# Patient Record
Sex: MALE | Race: WHITE | Employment: UNEMPLOYED | URBAN - METROPOLITAN AREA
[De-identification: names, ages, dates, MRNs, and addresses within clinical notes are randomized per-mention and may not be internally consistent; named-entity substitution may affect disease eponyms.]

---

## 2017-05-16 ENCOUNTER — APPOINTMENT (OUTPATIENT)
Dept: GENERAL RADIOLOGY | Age: 77
DRG: 871 | End: 2017-05-16
Attending: EMERGENCY MEDICINE
Payer: COMMERCIAL

## 2017-05-16 ENCOUNTER — HOSPITAL ENCOUNTER (INPATIENT)
Age: 77
LOS: 2 days | Discharge: HOME | DRG: 871 | End: 2017-05-18
Attending: EMERGENCY MEDICINE | Admitting: HOSPITALIST
Payer: COMMERCIAL

## 2017-05-16 ENCOUNTER — APPOINTMENT (OUTPATIENT)
Dept: CT IMAGING | Age: 77
DRG: 871 | End: 2017-05-16
Attending: PHYSICIAN ASSISTANT
Payer: COMMERCIAL

## 2017-05-16 DIAGNOSIS — J18.9 COMMUNITY ACQUIRED PNEUMONIA: Primary | ICD-10-CM

## 2017-05-16 DIAGNOSIS — J44.1 COPD EXACERBATION (HCC): ICD-10-CM

## 2017-05-16 PROBLEM — A41.9 SEPSIS (HCC): Status: ACTIVE | Noted: 2017-05-16

## 2017-05-16 LAB
ALBUMIN SERPL BCP-MCNC: 4 G/DL (ref 3.4–5)
ALBUMIN/GLOB SERPL: 0.9 {RATIO} (ref 0.8–1.7)
ALP SERPL-CCNC: 71 U/L (ref 45–117)
ALT SERPL-CCNC: 41 U/L (ref 16–61)
ANION GAP BLD CALC-SCNC: 12 MMOL/L (ref 3–18)
AST SERPL W P-5'-P-CCNC: 19 U/L (ref 15–37)
BASOPHILS # BLD AUTO: 0 K/UL (ref 0–0.1)
BASOPHILS # BLD: 0 % (ref 0–3)
BILIRUB SERPL-MCNC: 0.9 MG/DL (ref 0.2–1)
BNP SERPL-MCNC: 1424 PG/ML (ref 0–1800)
BUN SERPL-MCNC: 25 MG/DL (ref 7–18)
BUN/CREAT SERPL: 18 (ref 12–20)
CALCIUM SERPL-MCNC: 9.5 MG/DL (ref 8.5–10.1)
CHLORIDE SERPL-SCNC: 99 MMOL/L (ref 100–108)
CK MB CFR SERPL CALC: 2.3 % (ref 0–4)
CK MB SERPL-MCNC: 2.9 NG/ML (ref 5–25)
CK SERPL-CCNC: 128 U/L (ref 39–308)
CO2 SERPL-SCNC: 27 MMOL/L (ref 21–32)
CREAT SERPL-MCNC: 1.39 MG/DL (ref 0.6–1.3)
DIFFERENTIAL METHOD BLD: ABNORMAL
EOSINOPHIL # BLD: 0 K/UL (ref 0–0.4)
EOSINOPHIL NFR BLD: 0 % (ref 0–5)
ERYTHROCYTE [DISTWIDTH] IN BLOOD BY AUTOMATED COUNT: 15.6 % (ref 11.6–14.5)
GLOBULIN SER CALC-MCNC: 4.3 G/DL (ref 2–4)
GLUCOSE BLD STRIP.AUTO-MCNC: 196 MG/DL (ref 70–110)
GLUCOSE BLD STRIP.AUTO-MCNC: 216 MG/DL (ref 70–110)
GLUCOSE SERPL-MCNC: 197 MG/DL (ref 74–99)
HCT VFR BLD AUTO: 46.3 % (ref 36–48)
HGB BLD-MCNC: 15.4 G/DL (ref 13–16)
LACTATE SERPL-SCNC: 3.7 MMOL/L (ref 0.4–2)
LACTATE SERPL-SCNC: 4.3 MMOL/L (ref 0.4–2)
LACTATE SERPL-SCNC: 4.8 MMOL/L (ref 0.4–2)
LYMPHOCYTES # BLD AUTO: 5 % (ref 20–51)
LYMPHOCYTES # BLD: 1.2 K/UL (ref 0.8–3.5)
MCH RBC QN AUTO: 30.3 PG (ref 24–34)
MCHC RBC AUTO-ENTMCNC: 33.3 G/DL (ref 31–37)
MCV RBC AUTO: 91 FL (ref 74–97)
MONOCYTES # BLD: 1.2 K/UL (ref 0–1)
MONOCYTES NFR BLD AUTO: 5 % (ref 2–9)
NEUTS BAND NFR BLD MANUAL: 8 % (ref 0–5)
NEUTS SEG # BLD: 18.9 K/UL (ref 1.8–8)
NEUTS SEG NFR BLD AUTO: 82 % (ref 42–75)
PLATELET # BLD AUTO: 238 K/UL (ref 135–420)
PMV BLD AUTO: 10.4 FL (ref 9.2–11.8)
POTASSIUM SERPL-SCNC: 4.8 MMOL/L (ref 3.5–5.5)
PROT SERPL-MCNC: 8.3 G/DL (ref 6.4–8.2)
RBC # BLD AUTO: 5.09 M/UL (ref 4.7–5.5)
RBC MORPH BLD: ABNORMAL
SODIUM SERPL-SCNC: 138 MMOL/L (ref 136–145)
TROPONIN I SERPL-MCNC: <0.02 NG/ML (ref 0–0.06)
WBC # BLD AUTO: 23.1 K/UL (ref 4.6–13.2)

## 2017-05-16 PROCEDURE — 82550 ASSAY OF CK (CPK): CPT | Performed by: EMERGENCY MEDICINE

## 2017-05-16 PROCEDURE — 99285 EMERGENCY DEPT VISIT HI MDM: CPT

## 2017-05-16 PROCEDURE — 96365 THER/PROPH/DIAG IV INF INIT: CPT

## 2017-05-16 PROCEDURE — 87040 BLOOD CULTURE FOR BACTERIA: CPT | Performed by: PHYSICIAN ASSISTANT

## 2017-05-16 PROCEDURE — 74011000250 HC RX REV CODE- 250

## 2017-05-16 PROCEDURE — 83605 ASSAY OF LACTIC ACID: CPT | Performed by: HOSPITALIST

## 2017-05-16 PROCEDURE — 83880 ASSAY OF NATRIURETIC PEPTIDE: CPT | Performed by: EMERGENCY MEDICINE

## 2017-05-16 PROCEDURE — 94640 AIRWAY INHALATION TREATMENT: CPT

## 2017-05-16 PROCEDURE — 85025 COMPLETE CBC W/AUTO DIFF WBC: CPT | Performed by: EMERGENCY MEDICINE

## 2017-05-16 PROCEDURE — 36415 COLL VENOUS BLD VENIPUNCTURE: CPT | Performed by: HOSPITALIST

## 2017-05-16 PROCEDURE — 74011000258 HC RX REV CODE- 258: Performed by: PHYSICIAN ASSISTANT

## 2017-05-16 PROCEDURE — 96375 TX/PRO/DX INJ NEW DRUG ADDON: CPT

## 2017-05-16 PROCEDURE — 80053 COMPREHEN METABOLIC PANEL: CPT | Performed by: EMERGENCY MEDICINE

## 2017-05-16 PROCEDURE — 82962 GLUCOSE BLOOD TEST: CPT

## 2017-05-16 PROCEDURE — 71010 XR CHEST PORT: CPT

## 2017-05-16 PROCEDURE — 74011000250 HC RX REV CODE- 250: Performed by: HOSPITALIST

## 2017-05-16 PROCEDURE — 74011000250 HC RX REV CODE- 250: Performed by: PHYSICIAN ASSISTANT

## 2017-05-16 PROCEDURE — 74011250636 HC RX REV CODE- 250/636: Performed by: HOSPITALIST

## 2017-05-16 PROCEDURE — 65660000000 HC RM CCU STEPDOWN

## 2017-05-16 PROCEDURE — 87070 CULTURE OTHR SPECIMN AEROBIC: CPT | Performed by: HOSPITALIST

## 2017-05-16 PROCEDURE — 74011250636 HC RX REV CODE- 250/636: Performed by: FAMILY MEDICINE

## 2017-05-16 PROCEDURE — 74011250636 HC RX REV CODE- 250/636: Performed by: PHYSICIAN ASSISTANT

## 2017-05-16 PROCEDURE — 87077 CULTURE AEROBIC IDENTIFY: CPT | Performed by: HOSPITALIST

## 2017-05-16 PROCEDURE — 96361 HYDRATE IV INFUSION ADD-ON: CPT

## 2017-05-16 PROCEDURE — 77030013140 HC MSK NEB VYRM -A

## 2017-05-16 PROCEDURE — 83605 ASSAY OF LACTIC ACID: CPT | Performed by: PHYSICIAN ASSISTANT

## 2017-05-16 PROCEDURE — 93005 ELECTROCARDIOGRAM TRACING: CPT

## 2017-05-16 PROCEDURE — 74011250637 HC RX REV CODE- 250/637: Performed by: HOSPITALIST

## 2017-05-16 PROCEDURE — 87186 SC STD MICRODIL/AGAR DIL: CPT | Performed by: HOSPITALIST

## 2017-05-16 PROCEDURE — 94762 N-INVAS EAR/PLS OXIMTRY CONT: CPT

## 2017-05-16 PROCEDURE — 71275 CT ANGIOGRAPHY CHEST: CPT

## 2017-05-16 PROCEDURE — 74011636637 HC RX REV CODE- 636/637: Performed by: FAMILY MEDICINE

## 2017-05-16 PROCEDURE — 74011636320 HC RX REV CODE- 636/320: Performed by: EMERGENCY MEDICINE

## 2017-05-16 RX ORDER — IPRATROPIUM BROMIDE AND ALBUTEROL SULFATE 2.5; .5 MG/3ML; MG/3ML
3 SOLUTION RESPIRATORY (INHALATION)
Status: COMPLETED | OUTPATIENT
Start: 2017-05-16 | End: 2017-05-16

## 2017-05-16 RX ORDER — TAMSULOSIN HYDROCHLORIDE 0.4 MG/1
0.4 CAPSULE ORAL DAILY
Status: DISCONTINUED | OUTPATIENT
Start: 2017-05-17 | End: 2017-05-18 | Stop reason: HOSPADM

## 2017-05-16 RX ORDER — TORSEMIDE 5 MG/1
5 TABLET ORAL DAILY
COMMUNITY

## 2017-05-16 RX ORDER — TAMSULOSIN HYDROCHLORIDE 0.4 MG/1
0.4 CAPSULE ORAL DAILY
COMMUNITY

## 2017-05-16 RX ORDER — NEBIVOLOL 5 MG/1
5 TABLET ORAL DAILY
Status: DISCONTINUED | OUTPATIENT
Start: 2017-05-17 | End: 2017-05-18 | Stop reason: HOSPADM

## 2017-05-16 RX ORDER — LEVOFLOXACIN 5 MG/ML
750 INJECTION, SOLUTION INTRAVENOUS EVERY 24 HOURS
Status: DISCONTINUED | OUTPATIENT
Start: 2017-05-16 | End: 2017-05-17

## 2017-05-16 RX ORDER — NEBIVOLOL 5 MG/1
5 TABLET ORAL DAILY
COMMUNITY

## 2017-05-16 RX ORDER — IPRATROPIUM BROMIDE AND ALBUTEROL SULFATE 2.5; .5 MG/3ML; MG/3ML
3 SOLUTION RESPIRATORY (INHALATION)
Status: DISCONTINUED | OUTPATIENT
Start: 2017-05-16 | End: 2017-05-18 | Stop reason: HOSPADM

## 2017-05-16 RX ORDER — FLUTICASONE PROPIONATE AND SALMETEROL 100; 50 UG/1; UG/1
1 POWDER RESPIRATORY (INHALATION) EVERY 12 HOURS
COMMUNITY

## 2017-05-16 RX ORDER — ACETAMINOPHEN 325 MG/1
650 TABLET ORAL
Status: DISCONTINUED | OUTPATIENT
Start: 2017-05-16 | End: 2017-05-18 | Stop reason: HOSPADM

## 2017-05-16 RX ORDER — IPRATROPIUM BROMIDE AND ALBUTEROL SULFATE 2.5; .5 MG/3ML; MG/3ML
SOLUTION RESPIRATORY (INHALATION)
Status: COMPLETED
Start: 2017-05-16 | End: 2017-05-16

## 2017-05-16 RX ORDER — MAGNESIUM SULFATE HEPTAHYDRATE 40 MG/ML
2 INJECTION, SOLUTION INTRAVENOUS ONCE
Status: COMPLETED | OUTPATIENT
Start: 2017-05-16 | End: 2017-05-16

## 2017-05-16 RX ORDER — TORSEMIDE 20 MG/1
5 TABLET ORAL DAILY
Status: DISCONTINUED | OUTPATIENT
Start: 2017-05-17 | End: 2017-05-17

## 2017-05-16 RX ORDER — SIMVASTATIN 40 MG/1
40 TABLET, FILM COATED ORAL
COMMUNITY

## 2017-05-16 RX ORDER — SODIUM CHLORIDE 9 MG/ML
75 INJECTION, SOLUTION INTRAVENOUS CONTINUOUS
Status: DISCONTINUED | OUTPATIENT
Start: 2017-05-16 | End: 2017-05-17

## 2017-05-16 RX ORDER — SIMVASTATIN 40 MG/1
40 TABLET, FILM COATED ORAL
Status: DISCONTINUED | OUTPATIENT
Start: 2017-05-16 | End: 2017-05-18 | Stop reason: HOSPADM

## 2017-05-16 RX ORDER — SODIUM CHLORIDE 0.9 % (FLUSH) 0.9 %
5-10 SYRINGE (ML) INJECTION EVERY 8 HOURS
Status: DISCONTINUED | OUTPATIENT
Start: 2017-05-16 | End: 2017-05-18 | Stop reason: HOSPADM

## 2017-05-16 RX ORDER — INSULIN LISPRO 100 [IU]/ML
INJECTION, SOLUTION INTRAVENOUS; SUBCUTANEOUS
Status: DISCONTINUED | OUTPATIENT
Start: 2017-05-16 | End: 2017-05-18 | Stop reason: HOSPADM

## 2017-05-16 RX ORDER — SPIRONOLACTONE 25 MG/1
50 TABLET ORAL DAILY
Status: DISCONTINUED | OUTPATIENT
Start: 2017-05-17 | End: 2017-05-17

## 2017-05-16 RX ORDER — SODIUM CHLORIDE 0.9 % (FLUSH) 0.9 %
5-10 SYRINGE (ML) INJECTION AS NEEDED
Status: DISCONTINUED | OUTPATIENT
Start: 2017-05-16 | End: 2017-05-18 | Stop reason: HOSPADM

## 2017-05-16 RX ORDER — HEPARIN SODIUM 5000 [USP'U]/ML
5000 INJECTION, SOLUTION INTRAVENOUS; SUBCUTANEOUS EVERY 8 HOURS
Status: DISCONTINUED | OUTPATIENT
Start: 2017-05-16 | End: 2017-05-17

## 2017-05-16 RX ORDER — FLUTICASONE FUROATE AND VILANTEROL 100; 25 UG/1; UG/1
1 POWDER RESPIRATORY (INHALATION) DAILY
Status: DISCONTINUED | OUTPATIENT
Start: 2017-05-17 | End: 2017-05-18 | Stop reason: HOSPADM

## 2017-05-16 RX ORDER — SPIRONOLACTONE 50 MG/1
50 TABLET, FILM COATED ORAL DAILY
COMMUNITY

## 2017-05-16 RX ADMIN — METHYLPREDNISOLONE SODIUM SUCCINATE 125 MG: 125 INJECTION, POWDER, FOR SOLUTION INTRAMUSCULAR; INTRAVENOUS at 12:43

## 2017-05-16 RX ADMIN — IPRATROPIUM BROMIDE AND ALBUTEROL SULFATE 3 ML: .5; 3 SOLUTION RESPIRATORY (INHALATION) at 11:01

## 2017-05-16 RX ADMIN — IOPAMIDOL 100 ML: 755 INJECTION, SOLUTION INTRAVENOUS at 11:25

## 2017-05-16 RX ADMIN — AZTREONAM 2 G: 2 INJECTION, POWDER, LYOPHILIZED, FOR SOLUTION INTRAMUSCULAR; INTRAVENOUS at 11:58

## 2017-05-16 RX ADMIN — MAGNESIUM SULFATE HEPTAHYDRATE 2 G: 40 INJECTION, SOLUTION INTRAVENOUS at 12:44

## 2017-05-16 RX ADMIN — METHYLPREDNISOLONE SODIUM SUCCINATE 40 MG: 40 INJECTION, POWDER, FOR SOLUTION INTRAMUSCULAR; INTRAVENOUS at 22:45

## 2017-05-16 RX ADMIN — IPRATROPIUM BROMIDE AND ALBUTEROL SULFATE 3 ML: .5; 3 SOLUTION RESPIRATORY (INHALATION) at 11:00

## 2017-05-16 RX ADMIN — Medication 10 ML: at 18:11

## 2017-05-16 RX ADMIN — SODIUM CHLORIDE 1000 ML: 900 INJECTION, SOLUTION INTRAVENOUS at 13:30

## 2017-05-16 RX ADMIN — IPRATROPIUM BROMIDE AND ALBUTEROL SULFATE 3 ML: .5; 3 SOLUTION RESPIRATORY (INHALATION) at 20:23

## 2017-05-16 RX ADMIN — LEVOFLOXACIN 750 MG: 5 INJECTION, SOLUTION INTRAVENOUS at 13:32

## 2017-05-16 RX ADMIN — IPRATROPIUM BROMIDE AND ALBUTEROL SULFATE 3 ML: .5; 3 SOLUTION RESPIRATORY (INHALATION) at 12:16

## 2017-05-16 RX ADMIN — SODIUM CHLORIDE 75 ML/HR: 900 INJECTION, SOLUTION INTRAVENOUS at 18:10

## 2017-05-16 RX ADMIN — Medication 40 MG: at 22:45

## 2017-05-16 RX ADMIN — INSULIN LISPRO 4 UNITS: 100 INJECTION, SOLUTION INTRAVENOUS; SUBCUTANEOUS at 23:11

## 2017-05-16 RX ADMIN — IPRATROPIUM BROMIDE AND ALBUTEROL SULFATE 3 ML: .5; 3 SOLUTION RESPIRATORY (INHALATION) at 10:47

## 2017-05-16 RX ADMIN — SODIUM CHLORIDE 1000 ML: 900 INJECTION, SOLUTION INTRAVENOUS at 11:57

## 2017-05-16 RX ADMIN — Medication 10 ML: at 23:13

## 2017-05-16 NOTE — IP AVS SNAPSHOT
Jeanna Krueger 
 
 
 [address] [place] [id number] 
[phone] Patient: Elizabeth Gilbert MRN: [id number]:[id number] You are allergic to the following No active allergies Recent Documentation Height Weight BMI Smoking Status 1.79 m 99.2 kg 30.96 kg/m2 Former Smoker Unresulted Labs Order Current Status CULTURE, BLOOD Preliminary result CULTURE, RESPIRATORY/SPUTUM/BRONCH W GRAM STAIN Preliminary result Emergency Contacts Name Discharge Info Relation Home Work Mobile Gloria Babb DISCHARGE CAREGIVER [3] Child [2] [phone] About your hospitalization You were admitted on:  May 16, 2017 You last received care in the:  [facility] You were discharged on:  May 18, 2017 Unit phone number:  [phone] Why you were hospitalized Your primary diagnosis was:  Sepsis (Hcc) Your diagnoses also included:  Pneumonia, Chronic Obstructive Pulmonary Disease (Hcc), Bph (Benign Prostatic Hyperplasia), Atrial Fibrillation (Hcc), Controlled Type 2 Diabetes Mellitus Without Complication (Hcc) Providers Seen During Your Hospitalizations Provider Role Specialty Primary office phone Shawnee Weiss MD Attending Provider Emergency Medicine [phone] Katie Franklin MD Attending Provider [facility] [phone] Bette Nelson DO Attending Provider Internal Medicine [phone] Your Primary Care Physician (PCP) Primary Care Physician Office Phone Office Fax UNKNOWN, PROVIDER ** None ** ** None ** Follow-up Information Follow up With Details Comments Contact Info Provider Unknown In 1 week schedule a appointment with primary care physician in [facility] (when you return home) Patient not available to ask Current Discharge Medication List  
  
START taking these medications Dose & Instructions Dispensing Information Comments Morning Noon Evening Bedtime  
 levoFLOXacin 750 mg tablet Commonly known as:  [place] Your last dose was:  5/18/17 Your next dose is:  5/19/17 am  
   
 Dose:  750 mg Take 1 Tab by mouth every twenty-four (24) hours for 5 days. Quantity:  5 Tab Refills:  0  
     
  
   
   
   
  
 predniSONE 50 mg tablet Commonly known as:  Kasey Maria Your last dose was:  5/18/17 Your next dose is:  5/19/17 with lunch Dose:  50 mg Take 1 Tab by mouth daily (with lunch) for 5 days. Quantity:  5 Tab Refills:  0 CONTINUE these medications which have NOT CHANGED Dose & Instructions Dispensing Information Comments Morning Noon Evening Bedtime ADVAIR DISKUS 100-50 mcg/dose diskus inhaler Generic drug:  fluticasone-salmeterol Your last dose was:  5/18/17 9am  
Your next dose is:  5/18/17 bedtime Dose:  1 Puff Take 1 Puff by inhalation every twelve (12) hours. Refills:  0  
     
  
   
   
   
  
  
 COUMADIN 3 mg tablet Generic drug:  warfarin Your last dose was:  5/17/17 evening Your next dose is:  5/18/17 evening Notes to Patient: This medication was substitution for Marcumar Dose:  3 mg Take 3 mg by mouth daily. Refills:  0  
     
   
   
  
   
  
 metFORMIN 500 mg tablet Commonly known as:  GLUCOPHAGE Your last dose was:  None given while in hospital 
  
Your next dose is:  Resume at discharge; take as directed Take  by mouth two (2) times daily (with meals). Refills:  0  
     
  
   
   
  
   
  
 nebivolol 5 mg tablet Commonly known as:  BYSTOLIC Your last dose was:  5/18/17 am  
Your next dose is:  5/19/17 am  
   
 Dose:  5 mg Take 5 mg by mouth daily. Refills:  0  
     
  
   
   
   
  
 * OTHER Notes to Patient:  Take as directed Coumadin was given on 5/17/17 evening Dose:  3 mg Take 3 mg by mouth every evening. Marcumar 3 mg 1 tablet every evening Refills:  0  
     
   
   
  
   
  
 * OTHER Your last dose was:  None given while in hospital 
  
Your next dose is: Take as directed Take  by mouth daily. Geovany minor 0,07 Refills:  0  
     
  
   
   
   
  
 roflumilast Tab tablet Commonly known as:  Irwin Canavan Your last dose was:  5/18/17 am 
  
Your next dose is:  5/19/17 am 
  
   
 Dose:  500 mcg Take 500 mcg by mouth daily. Refills:  0  
     
  
   
   
   
  
 simvastatin 40 mg tablet Commonly known as:  ZOCOR Your last dose was:  5/17/17  Bedtime Your next dose is:  5/18/17 bedtime Dose:  40 mg Take 40 mg by mouth nightly. Refills:  0 SPIRIVA WITH HANDIHALER 18 mcg inhalation capsule Generic drug:  tiotropium Your last dose was:  5/18/17 am  
Your next dose is:  5/19/17 am  
   
 Take  by inhalation daily. Refills:  0  
     
  
   
   
   
  
 spironolactone 50 mg tablet Commonly known as:  ALDACTONE Your last dose was:  5/16/17 am  
Your next dose is:  5/19/17 am  
   
 Dose:  50 mg Take 50 mg by mouth daily. Refills:  0  
     
  
   
   
   
  
 tamsulosin 0.4 mg capsule Commonly known as:  FLOMAX Your last dose was:  5/18/17 am  
Your next dose is:  5/19/17 am 
  
   
 Dose:  0.4 mg Take 0.4 mg by mouth daily. Refills:  0  
     
  
   
   
   
  
 torsemide 5 mg tablet Commonly known as:  DEMADEX Your last dose was:  5/16/17 am 
  
Your next dose is:  5/19/17 am 
  
   
 Dose:  5 mg Take 5 mg by mouth daily. Refills:  0  
     
  
   
   
   
  
 * Notice: This list has 2 medication(s) that are the same as other medications prescribed for you. Read the directions carefully, and ask your doctor or other care provider to review them with you. Where to Get Your Medications Information on where to get these meds will be given to you by the nurse or doctor. ! Ask your nurse or doctor about these medications  
  levoFLOXacin 750 mg tablet  
 predniSONE 50 mg tablet Discharge Instructions Lab Results Component Value Date/Time Hemoglobin A1c 6.6 05/17/2017 10:04 AM  
 
 
This lab test reflects that your blood sugar has been slightly elevated over the past 3 months and should be evaluated by your primary care provider. An A1C of 5.7-6.4% meets the criteria for pre-diabetes; an A1C of 6.5% or higher meets the criteria for diabetes. This lab test reflects that your blood sugar averaged 143 mg/dL  over the past 3 months. It is important to follow up with your provider on a routine basis to continue to evaluate your blood sugar and discuss any necessary changes in treatment. DISCHARGE SUMMARY from Nurse The following personal items are in your possession at time of discharge: 
 
Dental Appliances: None Visual Aid: None Home Medications: Kept at bedside Jewelry: Necklace, Watch, Ring Clothing: None Other Valuables: [employer] Personal Items Sent to Safe: none PATIENT INSTRUCTIONS: 
 
What to do at Home: 
 
Recommended activity: Activity as tolerated. Please follow up with primary care physician in [facility]. *  Please give a list of your current medications to your Primary Care Provider. *  Please update this list whenever your medications are discontinued, doses are 
    changed, or new medications (including over-the-counter products) are added. *  Please carry medication information at all times in case of emergency situations. These are general instructions for a healthy lifestyle: No smoking/ No tobacco products/ Avoid exposure to second hand smoke Surgeon General's Warning:  Quitting smoking now greatly reduces serious risk to your health. Obesity, smoking, and sedentary lifestyle greatly increases your risk for illness A healthy diet, regular physical exercise & weight monitoring are important for maintaining a healthy lifestyle You may be retaining fluid if you have a history of heart failure or if you experience any of the following symptoms:  Weight gain of 3 pounds or more overnight or 5 pounds in a week, increased swelling in our hands or feet or shortness of breath while lying flat in bed. Please call your doctor as soon as you notice any of these symptoms; do not wait until your next office visit. Recognize signs and symptoms of STROKE: 
 
F-face looks uneven A-arms unable to move or move unevenly S-speech slurred or non-existent T-time-call 911 as soon as signs and symptoms begin-DO NOT go Back to bed or wait to see if you get better-TIME IS BRAIN. Warning Signs of HEART ATTACK Call 911 if you have these symptoms: 
? Chest discomfort. Most heart attacks involve discomfort in the center of the chest that lasts more than a few minutes, or that goes away and comes back. It can feel like uncomfortable pressure, squeezing, fullness, or pain. ? Discomfort in other areas of the upper body. Symptoms can include pain or discomfort in one or both arms, the back, neck, jaw, or stomach. ? Shortness of breath with or without chest discomfort. ? Other signs may include breaking out in a cold sweat, nausea, or lightheadedness. Don't wait more than five minutes to call [address]! Fast action can save your life. Calling 911 is almost always the fastest way to get lifesaving treatment. Emergency Medical Services staff can begin treatment when they arrive  up to an hour sooner than if someone gets to the hospital by car. The discharge information has been reviewed with the patient, spouse and daughter. The patient, spouse and daughter verbalized understanding.  
 
Discharge medications reviewed with the patient, spouse and daughter and appropriate educational materials and side effects teaching were provided. Patient armband removed and shredded Discharge Instructions Attachments/References COPD (ENGLISH) DIABETES DIET GUIDELINES: GENERAL INFO (ENGLISH) PNEUMONIA (ENGLISH) SEPSIS (ENGLISH) Discharge Orders None [facility] Announcement We are excited to announce that we are making your provider's discharge notes available to you in [employer]. You will see these notes when they are completed and signed by the physician that discharged you from your recent hospital stay. If you have any questions or concerns about any information you see in [employer], please call the Health Information Department where you were seen or reach out to your Primary Care Provider for more information about your plan of care. Introducing [facility]! Jesús Lackey introduces [employer] patient portal. Now you can access parts of your medical record, email your doctor's office, and request medication refills online. 1. In your internet browser, go to [email or web address] 2. Click on the First Time User? Click Here link in the Sign In box. You will see the New Member Sign Up page. 3. Enter your [employer] Access Code exactly as it appears below. You will not need to use this code after youve completed the sign-up process. If you do not sign up before the expiration date, you must request a new code. · [employer] Access Code: [email or web address] Expires: 8/15/2017  7:31 AM 
 
4. Enter the last four digits of your Social Security Number (xxxx) and Date of Birth (mm/dd/yyyy) as indicated and click Submit. You will be taken to the next sign-up page. 5. Create a eCardiot ID. This will be your [employer] login ID and cannot be changed, so think of one that is secure and easy to remember. 6. Create a eCardiot password. You can change your password at any time. 7. Enter your Password Reset Question and Answer. This can be used at a later time if you forget your password. 8. Enter your e-mail address. You will receive e-mail notification when new information is available in [address] 9. Click Sign Up. You can now view and download portions of your medical record. 10. Click the Download Summary menu link to download a portable copy of your medical information. If you have questions, please visit the Frequently Asked Questions section of the [employer] website. Remember, [employer] is NOT to be used for urgent needs. For medical emergencies, dial 911. Now available from your iPhone and Android! General Information Please provide this summary of care documentation to your next provider. Patient Signature:  ____________________________________________________________ Date:  ____________________________________________________________  
  
[facility] Provider Signature:  ____________________________________________________________ Date:  ____________________________________________________________ More Information Chronic Obstructive Pulmonary Disease (COPD): Care Instructions Your Care Instructions Chronic obstructive pulmonary disease (COPD) is a general term for a group of lung diseases, including emphysema and chronic bronchitis. People with COPD have decreased airflow in and out of the lungs, which makes it hard to breathe. The airways also can get clogged with thick mucus. Cigarette smoking is a major cause of COPD. Although there is no cure for COPD, you can slow its progress. Following your treatment plan and taking care of yourself can help you feel better and live longer. Follow-up care is a key part of your treatment and safety. Be sure to make and go to all appointments, and call your doctor if you are having problems.  It's also a good idea to know your test results and keep a list of the medicines you take. How can you care for yourself at home? Staying healthy · Do not smoke. This is the most important step you can take to prevent more damage to your lungs. If you need help quitting, talk to your doctor about stop-smoking programs and medicines. These can increase your chances of quitting for good. · Avoid colds and flu. Get a pneumococcal vaccine shot. If you have had one before, ask your doctor whether you need a second dose. Get the flu vaccine every fall. If you must be around people with colds or the flu, wash your hands often. · Avoid secondhand smoke, air pollution, and high altitudes. Also avoid cold, dry air and hot, humid air. Stay at home with your windows closed when air pollution is bad. Medicines and oxygen therapy · Take your medicines exactly as prescribed. Call your doctor if you think you are having a problem with your medicine. · You may be taking medicines such as: ¨ Bronchodilators. These help open your airways and make breathing easier. Bronchodilators are either short-acting (work for 6 to 9 hours) or long-acting (work for 24 hours). You inhale most bronchodilators, so they start to act quickly. Always carry your quick-relief inhaler with you in case you need it while you are away from home. ¨ Corticosteroids (prednisone, budesonide). These reduce airway inflammation. They come in pill or inhaled form. You must take these medicines every day for them to work well. · A spacer may help you get more inhaled medicine to your lungs. Ask your doctor or pharmacist if a spacer is right for you. If it is, ask how to use it properly. · Do not take any vitamins, over-the-counter medicine, or herbal products without talking to your doctor first. 
· If your doctor prescribed antibiotics, take them as directed. Do not stop taking them just because you feel better. You need to take the full course of antibiotics. · Oxygen therapy boosts the amount of oxygen in your blood and helps you breathe easier. Use the flow rate your doctor has recommended, and do not change it without talking to your doctor first. 
Activity · Get regular exercise. Walking is an easy way to get exercise. Start out slowly, and walk a little more each day. · Pay attention to your breathing. You are exercising too hard if you cannot talk while you are exercising. · Take short rest breaks when doing household chores and other activities. · Learn breathing methodssuch as breathing through pursed lipsto help you become less short of breath. · If your doctor has not set you up with a pulmonary rehabilitation program, talk to him or her about whether rehab is right for you. Rehab includes exercise programs, education about your disease and how to manage it, help with diet and other changes, and emotional support. Diet · Eat regular, healthy meals. Use bronchodilators about 1 hour before you eat to make it easier to eat. Eat several small meals instead of three large ones. Drink beverages at the end of the meal. Avoid foods that are hard to chew. · Eat foods that contain protein so that you do not lose muscle mass. Mental health · Talk to your family, friends, or a therapist about your feelings. It is normal to feel frightened, angry, hopeless, helpless, and even guilty. Talking openly about bad feelings can help you cope. If these feelings last, talk to your doctor. When should you call for help? Call 911 anytime you think you may need emergency care. For example, call if: 
· You have severe trouble breathing. Call your doctor now or seek immediate medical care if: 
· You have new or worse trouble breathing. · You cough up blood. · You have a fever.  
Watch closely for changes in your health, and be sure to contact your doctor if: 
· You cough more deeply or more often, especially if you notice more mucus or a change in the color of your mucus. · You have new or worse swelling in your legs or belly. · You are not getting better as expected. Where can you learn more? Go to http://tierney-chauncey.info/. Mack Lux in the search box to learn more about \"Chronic Obstructive Pulmonary Disease (COPD): Care Instructions. \" Current as of: May 23, 2016 Content Version: 11.2 © 4332-0337 [employer]. Care instructions adapted under license by [employer] (which disclaims liability or warranty for this information). If you have questions about a medical condition or this instruction, always ask your healthcare professional. [place] [address] any warranty or liability for your use of this information. Learning About Diabetes Food Guidelines Your Care Instructions Meal planning is important to manage diabetes. It helps keep your blood sugar at a target level (which you set with your doctor). You don't have to eat special foods. You can eat what your family eats, including sweets once in a while. But you do have to pay attention to how often you eat and how much you eat of certain foods. You may want to work with a dietitian or a certified diabetes educator (CDE) to help you plan meals and snacks. A dietitian or CDE can also help you lose weight if that is one of your goals. What should you know about eating carbs? Managing the amount of carbohydrate (carbs) you eat is an important part of healthy meals when you have diabetes. Carbohydrate is found in many foods. · Learn which foods have carbs. And learn the amounts of carbs in different foods. ¨ Bread, cereal, pasta, and rice have about 15 grams of carbs in a serving. A serving is 1 slice of bread (1 ounce), ½ cup of cooked cereal, or 1/3 cup of cooked pasta or rice. ¨ Fruits have 15 grams of carbs in a serving.  A serving is 1 small fresh fruit, such as an apple or orange; ½ of a banana; ½ cup of cooked or canned fruit; ½ cup of fruit juice; 1 cup of melon or raspberries; or 2 tablespoons of dried fruit. ¨ Milk and no-sugar-added yogurt have 15 grams of carbs in a serving. A serving is 1 cup of milk or 2/3 cup of no-sugar-added yogurt. ¨ Starchy vegetables have 15 grams of carbs in a serving. A serving is ½ cup of mashed potatoes or sweet potato; 1 cup winter squash; ½ of a small baked potato; ½ cup of cooked beans; or ½ cup cooked corn or green peas. · Learn how much carbs to eat each day and at each meal. A dietitian or CDE can teach you how to keep track of the amount of carbs you eat. This is called carbohydrate counting. · If you are not sure how to count carbohydrate grams, use the Plate Method to plan meals. It is a good, quick way to make sure that you have a balanced meal. It also helps you spread carbs throughout the day. ¨ Divide your plate by types of foods. Put non-starchy vegetables on half the plate, meat or other protein food on one-quarter of the plate, and a grain or starchy vegetable in the final quarter of the plate. To this you can add a small piece of fruit and 1 cup of milk or yogurt, depending on how many carbs you are supposed to eat at a meal. 
· Try to eat about the same amount of carbs at each meal. Do not \"save up\" your daily allowance of carbs to eat at one meal. 
· Proteins have very little or no carbs per serving. Examples of proteins are beef, chicken, turkey, fish, eggs, tofu, cheese, cottage cheese, and peanut butter. A serving size of meat is 3 ounces, which is about the size of a deck of cards. Examples of meat substitute serving sizes (equal to 1 ounce of meat) are 1/4 cup of cottage cheese, 1 egg, 1 tablespoon of peanut butter, and ½ cup of tofu. How can you eat out and still eat healthy? · Learn to estimate the serving sizes of foods that have carbohydrate.  If you measure food at home, it will be easier to estimate the amount in a serving of restaurant food. · If the meal you order has too much carbohydrate (such as potatoes, corn, or baked beans), ask to have a low-carbohydrate food instead. Ask for a salad or green vegetables. · If you use insulin, check your blood sugar before and after eating out to help you plan how much to eat in the future. · If you eat more carbohydrate at a meal than you had planned, take a walk or do other exercise. This will help lower your blood sugar. What else should you know? · Limit saturated fat, such as the fat from meat and dairy products. This is a healthy choice because people who have diabetes are at higher risk of heart disease. So choose lean cuts of meat and nonfat or low-fat dairy products. Use olive or canola oil instead of butter or shortening when cooking. · Don't skip meals. Your blood sugar may drop too low if you skip meals and take insulin or certain medicines for diabetes. · Check with your doctor before you drink alcohol. Alcohol can cause your blood sugar to drop too low. Alcohol can also cause a bad reaction if you take certain diabetes medicines. Follow-up care is a key part of your treatment and safety. Be sure to make and go to all appointments, and call your doctor if you are having problems. It's also a good idea to know your test results and keep a list of the medicines you take. Where can you learn more? Go to http://tierney-chauncey.info/. Enter S422 in the search box to learn more about \"Learning About Diabetes Food Guidelines. \" Current as of: May 23, 2016 Content Version: 11.2 © 1528-4746 [employer]. Care instructions adapted under license by [employer] (which disclaims liability or warranty for this information).  If you have questions about a medical condition or this instruction, always ask your healthcare professional. [employer] disclaims any warranty or liability for your use of this information. Pneumonia: Care Instructions Your Care Instructions Pneumonia is an infection of the lungs. Most cases are caused by infections from bacteria or viruses. Pneumonia may be mild or very severe. If it is caused by bacteria, you will be treated with antibiotics. It may take a few weeks to a few months to recover fully from pneumonia, depending on how sick you were and whether your overall health is good. Follow-up care is a key part of your treatment and safety. Be sure to make and go to all appointments, and call your doctor if you are having problems. Its also a good idea to know your test results and keep a list of the medicines you take. How can you care for yourself at home? · Take your antibiotics exactly as directed. Do not stop taking the medicine just because you are feeling better. You need to take the full course of antibiotics. · Take your medicines exactly as prescribed. Call your doctor if you think you are having a problem with your medicine. · Get plenty of rest and sleep. You may feel weak and tired for a while, but your energy level will improve with time. · To prevent dehydration, drink plenty of fluids, enough so that your urine is light yellow or clear like water. Choose water and other caffeine-free clear liquids until you feel better. If you have kidney, heart, or liver disease and have to limit fluids, talk with your doctor before you increase the amount of fluids you drink. · Take care of your cough so you can rest. A cough that brings up mucus from your lungs is common with pneumonia. It is one way your body gets rid of the infection. But if coughing keeps you from resting or causes severe fatigue and chest-wall pain, talk to your doctor. He or she may suggest that you take a medicine to reduce the cough. · Use a vaporizer or humidifier to add moisture to your bedroom.  Follow the directions for cleaning the machine. · Do not smoke or allow others to smoke around you. Smoke will make your cough last longer. If you need help quitting, talk to your doctor about stop-smoking programs and medicines. These can increase your chances of quitting for good. · Take an over-the-counter pain medicine, such as acetaminophen (Tylenol), ibuprofen (Advil, Motrin), or naproxen (Aleve). Read and follow all instructions on the label. · Do not take two or more pain medicines at the same time unless the doctor told you to. Many pain medicines have acetaminophen, which is Tylenol. Too much acetaminophen (Tylenol) can be harmful. · If you were given a spirometer to measure how well your lungs are working, use it as instructed. This can help your doctor tell how your recovery is going. · To prevent pneumonia in the future, talk to your doctor about getting a flu vaccine (once a year) and a pneumococcal vaccine (one time only for most people). When should you call for help? Call 911 anytime you think you may need emergency care. For example, call if: 
· You have severe trouble breathing. Call your doctor now or seek immediate medical care if: 
· You cough up dark brown or bloody mucus (sputum). · You have new or worse trouble breathing. · You are dizzy or lightheaded, or you feel like you may faint. Watch closely for changes in your health, and be sure to contact your doctor if: 
· You have a new or higher fever. · You are coughing more deeply or more often. · You are not getting better after 2 days (48 hours). · You do not get better as expected. Where can you learn more? Go to http://tierney-chauncey.info/. Enter [id number] in the search box to learn more about \"Pneumonia: Care Instructions. \" Current as of: May 23, 2016 Content Version: 11.2 © 5159-0798 [employer].  Care instructions adapted under license by [employer] (which disclaims liability or warranty for this information). If you have questions about a medical condition or this instruction, always ask your healthcare professional. [address] any warranty or liability for your use of this information. Sepsis: Care Instructions Your Care Instructions Sepsis is an infection that has spread throughout your body. It is a life-threatening condition and often causes extremely low blood pressure. This can lead to problems with many different organs. The cause of sepsis is not always clear, but it can happen as part of a long-term or sudden illness. Sometimes even a mild illness can lead to sepsis. Follow-up care is a key part of your treatment and safety. Be sure to make and go to all appointments, and call your doctor if you are having problems. Its also a good idea to know your test results and keep a list of the medicines you take. How can you care for yourself at home? · If your doctor prescribed antibiotics, take them as directed. Do not stop taking them just because you feel better. You need to take the full course of antibiotics. · Drink plenty of fluids, enough so that your urine is light yellow or clear like water. Choose water or caffeine-free clear liquids until you feel better. If you have kidney, heart, or liver disease and have to limit fluids, talk with your doctor before you increase your fluid intake. You can try rehydration drinks, such as Gatorade or Powerade. · Do not drink alcohol. · Eat a healthy diet. Include fruits, vegetables, and whole grains in your diet every day. · Walking is an easy way to get exercise. Gradually increase the amount you walk every day. Make sure your doctor knows that you are starting an exercise program. 
· Do not smoke or use other tobacco products. If you need help quitting, talk to your doctor about stop-smoking programs and medicines. These can increase your chances of quitting for good. When should you call for help? Call 911 anytime you think you may need emergency care. For example, call if: 
· You passed out (lost consciousness). Call your doctor now or seek immediate medical care if: 
· You have a fever or chills. · You have cool, pale, or clammy skin. · You are dizzy or lightheaded, or you feel like you may faint. · You have any new symptoms, such as a cough, pain in one part of your body, or urinary problems. Watch closely for changes in your health, and be sure to contact your doctor if: 
· You do not get better as expected. Where can you learn more? Go to http://tierney-chauncey.info/. Enter B302 in the search box to learn more about \"Sepsis: Care Instructions. \" Current as of: May 27, 2016 Content Version: 11.2 © 0375-4589 [employer]. Care instructions adapted under license by "[employer]" (which disclaims liability or warranty for this information). If you have questions about a medical condition or this instruction, always ask your healthcare professional. [address] any warranty or liability for your use of this information.

## 2017-05-16 NOTE — LETTER
[facility] [place] 
[facility] 
[place] [id number] 
[phone] Work/School Note Date: 5/16/2017 To Whom It May concern: 
 
Tank Birch was seen and treated today in the emergency room by the following provider(s): 
Attending Provider: Maty Olivarez MD 
Physician Assistant: LEONARDO Garcia. Due to an unexpected medical emergency Tank Birch will not be able to fly for several days.  
 
Sincerely, 
 
 
 
 
Maty Olivarez MD

## 2017-05-16 NOTE — ROUTINE PROCESS
TRANSFER - IN REPORT:    Verbal report received from SERJIO. La (name) on Brody Coley  being received from ED (unit) for routine progression of care      Report consisted of patients Situation, Background, Assessment and   Recommendations(SBAR). Information from the following report(s) SBAR, Kardex, ED Summary and MAR was reviewed with the receiving nurse. Opportunity for questions and clarification was provided. Assessment completed upon patients arrival to unit and care assumed.

## 2017-05-16 NOTE — ED NOTES
TRANSFER - OUT REPORT:    Verbal report given to Roya De Luna (name) on Ramiro Bae  being transferred to [address] [place] [address] (unit) for routine progression of care       Report consisted of patients Situation, Background, Assessment and   Recommendations(SBAR). Information from the following report(s) SBAR, Kardex, Intake/Output, MAR and Recent Results MEWS was reviewed with the receiving nurse. Lines:   Peripheral IV 05/16/17 Right Antecubital (Active)        Opportunity for questions and clarification was provided. Patient transported with:   Tech  Monitor   Zamzam Suarez RN     Handoff Sepsis Algorithm reviewed:    Time Blood Cultures Drawn 1200       Time Initial Lactic Drawn:  1200   Result: 3.7  Time Next Lactic Acid Due: 1600  Time target fluid bolus was initiated:  Time antibiotics started: 1200    Sepsis Screening completed  (  )Patient meets SIRS criteria. (  )Patient does not meet SIRS criteria.     SIRS Criteria is achieved when two or more of the following are present   Temperature < 96.8°F (36°C) or > 100.9°F (38.3°C)   Heart Rate > 90 beats per minute   Respiratory Rate > 20 breaths per minute   WBC count > 12,000 or <4,000 or > 10% bands    Time Severe Sepsis Met:  Severe Sepsis =  SIRS Criteria + Source of Infection + Organ Dysfunction   Organ Dysfunction is met when the patient has new onset:   SBP<90 or MAP<65 or decrease in SBP more than 40 points from baseline   Bilirubin>2   INR>1.5 or aPTT>60   Creatinine>2 or UO<0.5 ml/kg/hr for 2 hours   Platelet count < 935,619   Lactate >2   Acute Respiratory Failure   (BiPap/CPAP/Ventilator)    Time Septic Shock Met[de-identified]  Septic Shock = Severe Sepsis + Any of the following   Initial Lactate > 4   Persistent hypotension after crystalloid fluid bolus (30ml/kg) completion  o 2 or more readings of SBP<90, MAP<65, or SBP more than 40 points from baseline

## 2017-05-16 NOTE — ED PROVIDER NOTES
[address]  EMERGENCY DEPARTMENT HISTORY AND PHYSICAL EXAM       Date: 5/16/2017   Patient Name: Alia Kiser   YOB: 1940  Medical Record Number: [id number]    History of Presenting Illness     Chief Complaint   Patient presents with    Shortness of Breath        History Provided By:  patient    Additional History:   10:39 AM   Alia Kiser is a 68 y.o. male with a hx of COPD and A-fib (for which he takes blood thinner) for which who presents to the emergency department c/o SOB onset last night. Associated symptoms include cough (brown/blood tinged), right sided rib pain (\"feels like it's fluid\"), and tremors. Daughter reports pt has been using a nebulizer breathing treatment this morning and has been taking steroid (last dose last night). Pt is visiting from [place]. Pt denies fever, current tobacco use (former smoker), and any other symptoms or complaints. Primary Care Provider: Not On File Bsi   Specialist:    Past History     Past Medical History:   Past Medical History:   Diagnosis Date    Atrial fibrillation ([address]     BPH (benign prostatic hyperplasia)     Chronic obstructive pulmonary disease ([facility]         Past Surgical History:   Past Surgical History:   Procedure Laterality Date    HX HIP REPLACEMENT          Family History:   History reviewed. No pertinent family history. Social History:   Social History   Substance Use Topics    Smoking status: Former Smoker    Smokeless tobacco: None    Alcohol use Yes        Allergies:   No Known Allergies     Review of Systems   Review of Systems   Constitutional: Negative for fever. Respiratory: Positive for cough and shortness of breath. Musculoskeletal: Positive for arthralgias (right sided rib pain). Neurological: Positive for tremors. All other systems reviewed and are negative.       Physical Exam  Vitals:    05/16/17 1217 05/16/17 1245 05/16/17 1246 05/16/17 1247   BP:  118/72     Pulse:  (!) 127     Resp:  24     Temp:       SpO2: 95% 91% 92% 92%   Weight:       Height:           Physical Exam   Constitutional: He is oriented to person, place, and time. He appears well-developed and well-nourished. Mild respiratory distress, sitting up on stretcher speaks short broken sentences but smiles & jokes at times   HENT:   Head: Normocephalic and atraumatic. Eyes: Conjunctivae and EOM are normal. Pupils are equal, round, and reactive to light. Neck: Normal range of motion. Neck supple. Cardiovascular: Normal rate. An irregularly irregular rhythm present. Pulmonary/Chest: He is in respiratory distress. He has wheezes. He has rhonchi. Wet sounding cough noted   Abdominal: Soft. Bowel sounds are normal. There is no tenderness. Musculoskeletal: Normal range of motion. He exhibits no edema or tenderness. Neurological: He is alert and oriented to person, place, and time. Skin: Skin is warm and dry. Psychiatric: He has a normal mood and affect. His behavior is normal.   Nursing note and vitals reviewed.           Diagnostic Study Results     Labs -      Recent Results (from the past 12 hour(s))   EKG, 12 LEAD, INITIAL    Collection Time: 05/16/17 10:32 AM   Result Value Ref Range    Ventricular Rate 102 BPM    Atrial Rate 98 BPM    QRS Duration 74 ms    Q-T Interval 314 ms    QTC Calculation (Bezet) 409 ms    Calculated R Axis 76 degrees    Calculated T Axis -137 degrees    Diagnosis       Atrial fibrillation with rapid ventricular response  ST & T wave abnormality, consider lateral ischemia or digitalis effect  Abnormal ECG  No previous ECGs available     CBC WITH AUTOMATED DIFF    Collection Time: 05/16/17 10:35 AM   Result Value Ref Range    WBC 23.1 (H) 4.6 - 13.2 K/uL    RBC 5.09 4.70 - 5.50 M/uL    HGB 15.4 13.0 - 16.0 g/dL    HCT 46.3 36.0 - 48.0 %    MCV 91.0 74.0 - 97.0 FL    MCH 30.3 24.0 - 34.0 PG    MCHC 33.3 31.0 - 37.0 g/dL    RDW 15.6 (H) 11.6 - 14.5 %    PLATELET 100 426 - 074 K/uL MPV 10.4 9.2 - 11.8 FL    NEUTROPHILS 82 (H) 42 - 75 %    BAND NEUTROPHILS 8 (H) 0 - 5 %    LYMPHOCYTES 5 (L) 20 - 51 %    MONOCYTES 5 2 - 9 %    EOSINOPHILS 0 0 - 5 %    BASOPHILS 0 0 - 3 %    ABS. NEUTROPHILS 18.9 (H) 1.8 - 8.0 K/UL    ABS. LYMPHOCYTES 1.2 0.8 - 3.5 K/UL    ABS. MONOCYTES 1.2 (H) 0 - 1.0 K/UL    ABS. EOSINOPHILS 0.0 0.0 - 0.4 K/UL    ABS. BASOPHILS 0.0 0.0 - 0.1 K/UL    RBC COMMENTS ANISOCYTOSIS  1+        DF MANUAL     METABOLIC PANEL, COMPREHENSIVE    Collection Time: 05/16/17 10:35 AM   Result Value Ref Range    Sodium 138 136 - 145 mmol/L    Potassium 4.8 3.5 - 5.5 mmol/L    Chloride 99 (L) 100 - 108 mmol/L    CO2 27 21 - 32 mmol/L    Anion gap 12 3.0 - 18 mmol/L    Glucose 197 (H) 74 - 99 mg/dL    BUN 25 (H) 7.0 - 18 MG/DL    Creatinine 1.39 (H) 0.6 - 1.3 MG/DL    BUN/Creatinine ratio 18 12 - 20      GFR est AA >60 >60 ml/min/1.73m2    GFR est non-AA 50 (L) >60 ml/min/1.73m2    Calcium 9.5 8.5 - 10.1 MG/DL    Bilirubin, total 0.9 0.2 - 1.0 MG/DL    ALT (SGPT) 41 16 - 61 U/L    AST (SGOT) 19 15 - 37 U/L    Alk. phosphatase 71 45 - 117 U/L    Protein, total 8.3 (H) 6.4 - 8.2 g/dL    Albumin 4.0 3.4 - 5.0 g/dL    Globulin 4.3 (H) 2.0 - 4.0 g/dL    A-G Ratio 0.9 0.8 - 1.7     CARDIAC PANEL,(CK, CKMB & TROPONIN)    Collection Time: 05/16/17 10:35 AM   Result Value Ref Range     39 - 308 U/L    CK - MB 2.9 <3.6 ng/ml    CK-MB Index 2.3 0.0 - 4.0 %    Troponin-I, Qt. <0.02 0.00 - 0.06 NG/ML   PRO-BNP    Collection Time: 05/16/17 10:35 AM   Result Value Ref Range    NT pro-BNP 1424 0 - 1800 PG/ML   LACTIC ACID, PLASMA    Collection Time: 05/16/17 12:00 PM   Result Value Ref Range    Lactic acid 3.7 (HH) 0.4 - 2.0 MMOL/L       Radiologic Studies -  The following have been ordered and reviewed:    11:53 AM  RADIOLOGY FINDINGS  Chest X-ray shows RLL infiltrate  Pending review by Radiologist     CTA CHEST W OR W WO CONT   Final Result  1. No CT evidence of pulmonary thromboembolism.      2. Atherosclerotic aorta with suspected, multifocal ulcerating plaque in  proximal left subclavian artery just distal to origin.     3. Right middle lobe and lower lobe pneumonia. In view of somewhat masslike  appearance of several areas, recommend follow-up contrast enhanced chest CT post  treatment to exclude underlying lesion. As read by the radiologist.       XR CHEST PORT    (Results Pending)        Medical Decision Making   I am the first provider for this patient. I reviewed the vital signs, available nursing notes, past medical history, past surgical history, family history and social history. Vital Signs-Reviewed the patient's vital signs. Patient Vitals for the past 12 hrs:   Temp Pulse Resp BP SpO2   05/16/17 1247 - - - - 92 %   05/16/17 1246 - - - - 92 %   05/16/17 1245 - (!) 127 24 118/72 91 %   05/16/17 1217 - - - - 95 %   05/16/17 1215 - (!) 109 30 121/70 93 %   05/16/17 1101 - - - - 99 %   05/16/17 1100 - (!) 107 (!) 31 125/85 94 %   05/16/17 1049 - - - - 99 %   05/16/17 1045 - (!) 121 (!) 37 134/68 92 %   05/16/17 1030 - (!) 105 (!) 35 125/80 -   05/16/17 1028 98.5 °F (36.9 °C) (!) 102 25 (!) 170/95 99 %       Pulse Oximetry Analysis - Normal 99% on RA     Cardiac Monitor :   Rate: 102 bpm  Rhythm: Atrial Fibrillation     EKG interpretation: (Preliminary)  Rhythm: Atrial fibrillation with rapid ventricular response. Rate (approx.): 102 bpm; ST & T wave abnormality, consider lateral ischemia or digitalis effect, no priors, no acute changes, increased ratio baseline   EKG read by Roscoe Smiley MD  at 10:32    Old Medical Records: Nursing notes.      Procedures:   Procedures      Medications Given in the ED:  Medications   albuterol-ipratropium (DUO-NEB) 2.5 mg-0.5 mg/3 ml nebulizer solution (not administered)   aztreonam (AZACTAM) 2 g in 0.9% sodium chloride (MBP/ADV) 100 mL MBP (2 g IntraVENous New Bag 5/16/17 1158)   levoFLOXacin (LEVAQUIN) 750 mg in D5W IVPB (750 mg IntraVENous New Bag 5/16/17 1332)   magnesium sulfate 2 g/50 ml IVPB (premix or compounded) (2 g IntraVENous New Bag 5/16/17 1244)   sodium chloride 0.9 % bolus infusion 1,000 mL (1,000 mL IntraVENous New Bag 5/16/17 1330)   albuterol-ipratropium (DUO-NEB) 2.5 MG-0.5 MG/3 ML (3 mL Nebulization Given 5/16/17 1100)   albuterol-ipratropium (DUO-NEB) 2.5 MG-0.5 MG/3 ML (3 mL Nebulization Given 5/16/17 1047)   iopamidol (ISOVUE-370) 76 % injection 100 mL (100 mL IntraVENous Given 5/16/17 1125)   sodium chloride 0.9 % bolus infusion 1,000 mL (0 mL IntraVENous IV Completed 5/16/17 1330)   albuterol-ipratropium (DUO-NEB) 2.5 MG-0.5 MG/3 ML (3 mL Nebulization Given 5/16/17 1216)   methylPREDNISolone (PF) (SOLU-MEDROL) injection 125 mg (125 mg IntraVENous Given 5/16/17 1243)        ED Course    10:39 AM   Initial assessment performed. The patients presenting problems have been discussed, and they are in agreement with the care plan formulated and outlined with them. I have encouraged them to ask questions as they arise throughout their visit. 12:28 PM   Discussed patient's history, exam, and available diagnostics results with Sarah Gómez MD, ED Attending, who evaluated the pt and agrees with need for admission. He suggests adding magnesium to medication regimen while in the ED.          12:20 PM FACE TO FACE  ED ATTENDING NOTE:    I have reviewed the documentation provided by the [facility], discussed their findings, clinical impression, and the proposed management plans with regards to this patient's encounter. I have personally evaluated the patient in the emergency department to verify the history and confirm physical findings. I have also reviewed the pertinent lab and/or radiology studies up to this point. Based on this evaluation my clinical impression is that of a patient presenting with dyspnea. Clinical evidence suggest pneunonia with an O2 supplementation.     Recommendations:  Specific input or recommendations to the management of the patient are as follows: based on radiographic changes and clinical exam, the patient should be admitted for continued parenteral antibiotics. Marianela Hernandez MD        12:43 PM   Discussed patient's history, exam, and available diagnostics results with Bianca Chavarria MD, internal medicine, who agrees with admission to telemetry. 12:47 PM  Patient is being admitted to the hospital by Bianca Chavarria MD. The results of their tests and reasons for their admission have been discussed with them and/or available family. They convey agreement and understanding for the need to be admitted and for their admission diagnosis. CONDITIONS ON ADMISSION:  Deep Vein Thrombosis is not present at the time of admission. Thrombosis is not present at the time of admission. Pneumonia is present at the time of admission. MRSA is not present at the time of admission. Wound infection is not present at the time of admission. Pressure Ulcer is not present at the time of admission. Critical Care Time:   12:41 PM  I have spent 30 minutes of critical care time involved in lab review, consultations with specialist, family decision-making, and documentation. During this entire length of time I was immediately available to the patient. Critical Care: The reason for providing this level of medical care for this critically ill patient was due a critical illness that impaired one or more vital organ systems such that there was a high probability of imminent or life threatening deterioration in the patients condition. This care involved high complexity decision making to assess, manipulate, and support vital system functions, to treat this degreee vital organ system failure and to prevent further life threatening deterioration of the patients condition. Diagnosis   Clinical Impression:   1. Community acquired pneumonia    2. COPD exacerbation ([address]         1:25PM  Critical result called to ED.  Lactic acid 3.7. Pt already admitted to hospitalist but still in ED. Dr. Carlyle Nyhan in ED evaluating pt. He and Dr. Ethan Altamirano were made aware of elevated lactic acid, pt therefore meets severe sepsis criteria. Already receiving abx for pneumonia. They asked that I initiate sepsis bundle, I also ordered repeat lactic 4 hours later at 1600. Pt's BP stable, not requiring resuscitative fluids at this time, Dr. Carlyle Nyhan will monitor for this need. Written by LEONARDO Christie.     ___________________   Attestations: This note is prepared by Kendal Fitzpatrick, acting as a Scribe for Fabio Cruz PA-C at 10:28 AM on 5/16/2017    Fabio Cruz PA-C: The scribe's documentation has been prepared under my direction and personally reviewed by me in its entirety.   _______________________________

## 2017-05-16 NOTE — ED TRIAGE NOTES
Patient flew here from [place] 3 weeks ago. Patient started with coughing and shortness of breath last night that has become progressively worse. Sepsis Screening completed    ( x )Patient meets SIRS criteria. (  )Patient does not meet SIRS criteria.       SIRS Criteria is achieved when two or more of the following are present   Temperature < 96.8°F (36°C) or > 100.9°F (38.3°C)   Heart Rate > 90 beats per minute   Respiratory Rate > 20 breaths per minute   WBC count > 12,000 or <4,000 or > 10% bands

## 2017-05-16 NOTE — IP AVS SNAPSHOT
Summary of Care Report The Summary of Care report has been created to help improve care coordination. Users with access to "[employer] or [address] (Web-based application) may access additional patient information including the Discharge Summary. If you are not currently a [address] user and need more information, please call the number listed below in the [place] [address] section and ask to be connected with Medical Records. Facility Information Name Address Phone [address] [address] [phone] Patient Information Patient Name Sex AARTI Branch Son ([id number]) Male 1940 Discharge Information Admitting Provider Service Area Unit Robb Giordano MD / [phone]/Med / [phone] Discharge Provider Discharge Date/Time Discharge Disposition Destination (none) 2017 Midday (Pending) AHR (none) Patient Language Language Portuguese [17] Hospital Problems as of 2017  Never Reviewed Class Noted - Resolved Last Modified POA Active Problems Pneumonia  2017 - Present 2017 by Viraj Chairez DO Yes Entered by LEONARDO Abdalla Chronic obstructive pulmonary disease ([facility]  Unknown - Present 2017 by Robb Giordano MD Yes Entered by Robb Giordano MD  
  BPH (benign prostatic hyperplasia)  Unknown - Present 2017 by Robb Giordano MD Yes Entered by Robb Giordano MD  
  Atrial fibrillation [facility])  Unknown - Present 2017 by Robb Giordano MD Yes Entered by Robb Giordano MD  
  * (Principal)Sepsis [facility])  2017 - Present 2017 by Viraj Chairez DO Yes   Entered by Robb Giordano MD  
  Controlled type 2 diabetes mellitus without complication ([facility] 5/17/2017 - Present 5/17/2017 by Shalini Earl, DO Yes Entered by Shalini Earl, DO You are allergic to the following No active allergies Current Discharge Medication List  
  
START taking these medications Dose & Instructions Dispensing Information Comments  
 levoFLOXacin 750 mg tablet Commonly known as:  Heriberto Ivey Dose:  750 mg Take 1 Tab by mouth every twenty-four (24) hours for 5 days. Quantity:  5 Tab Refills:  0  
   
 predniSONE 50 mg tablet Commonly known as:  Tali Pandya Dose:  50 mg Take 1 Tab by mouth daily (with lunch) for 5 days. Quantity:  5 Tab Refills:  0 CONTINUE these medications which have NOT CHANGED Dose & Instructions Dispensing Information Comments ADVAIR DISKUS 100-50 mcg/dose diskus inhaler Generic drug:  fluticasone-salmeterol Dose:  1 Puff Take 1 Puff by inhalation every twelve (12) hours. Refills:  0  
   
 COUMADIN 3 mg tablet Generic drug:  warfarin Notes to Patient: This medication was substitution for Marcumar Dose:  3 mg Take 3 mg by mouth daily. Refills:  0  
   
 metFORMIN 500 mg tablet Commonly known as:  GLUCOPHAGE Take  by mouth two (2) times daily (with meals). Refills:  0  
   
 nebivolol 5 mg tablet Commonly known as:  BYSTOLIC Dose:  5 mg Take 5 mg by mouth daily. Refills:  0  
   
 * OTHER Notes to Patient:  Take as directed Coumadin was given on 5/17/17 evening Dose:  3 mg Take 3 mg by mouth every evening. Marcumar 3 mg 1 tablet every evening Refills:  0  
   
 * OTHER Take  by mouth daily. Digimerck minor 0,07 Refills:  0  
   
 roflumilast Tab tablet Commonly known as:  Tyler Coral Dose:  500 mcg Take 500 mcg by mouth daily. Refills:  0  
   
 simvastatin 40 mg tablet Commonly known as:  ZOCOR Dose:  40 mg Take 40 mg by mouth nightly. Refills:  0 SPIRIVA WITH HANDIHALER 18 mcg inhalation capsule Generic drug:  tiotropium Take  by inhalation daily. Refills:  0  
   
 spironolactone 50 mg tablet Commonly known as:  ALDACTONE Dose:  50 mg Take 50 mg by mouth daily. Refills:  0  
   
 tamsulosin 0.4 mg capsule Commonly known as:  FLOMAX Dose:  0.4 mg Take 0.4 mg by mouth daily. Refills:  0  
   
 torsemide 5 mg tablet Commonly known as:  DEMADEX Dose:  5 mg Take 5 mg by mouth daily. Refills:  0  
   
 * Notice: This list has 2 medication(s) that are the same as other medications prescribed for you. Read the directions carefully, and ask your doctor or other care provider to review them with you. Follow-up Information Follow up With Details Comments Contact Info Provider Unknown In 1 week schedule a appointment with primary care physician in [facility] (when you return home) Patient not available to ask Discharge Instructions Lab Results Component Value Date/Time Hemoglobin A1c 6.6 05/17/2017 10:04 AM  
 
 
This lab test reflects that your blood sugar has been slightly elevated over the past 3 months and should be evaluated by your primary care provider. An A1C of 5.7-6.4% meets the criteria for pre-diabetes; an A1C of 6.5% or higher meets the criteria for diabetes. This lab test reflects that your blood sugar averaged 143 mg/dL  over the past 3 months. It is important to follow up with your provider on a routine basis to continue to evaluate your blood sugar and discuss any necessary changes in treatment. DISCHARGE SUMMARY from Nurse The following personal items are in your possession at time of discharge: 
 
Dental Appliances: None Visual Aid: None Home Medications: Kept at bedside Jewelry: Necklace, Watch, Ring Clothing: None Other Valuables: Peterson Ganlisa Personal Items Sent to Safe: none PATIENT INSTRUCTIONS: 
 
What to do at Home: 
 
Recommended activity: Activity as tolerated. Please follow up with primary care physician in [facility]. *  Please give a list of your current medications to your Primary Care Provider. *  Please update this list whenever your medications are discontinued, doses are 
    changed, or new medications (including over-the-counter products) are added. *  Please carry medication information at all times in case of emergency situations. These are general instructions for a healthy lifestyle: No smoking/ No tobacco products/ Avoid exposure to second hand smoke Surgeon General's Warning:  Quitting smoking now greatly reduces serious risk to your health. Obesity, smoking, and sedentary lifestyle greatly increases your risk for illness A healthy diet, regular physical exercise & weight monitoring are important for maintaining a healthy lifestyle You may be retaining fluid if you have a history of heart failure or if you experience any of the following symptoms:  Weight gain of 3 pounds or more overnight or 5 pounds in a week, increased swelling in our hands or feet or shortness of breath while lying flat in bed. Please call your doctor as soon as you notice any of these symptoms; do not wait until your next office visit. Recognize signs and symptoms of STROKE: 
 
F-face looks uneven A-arms unable to move or move unevenly S-speech slurred or non-existent T-time-call 911 as soon as signs and symptoms begin-DO NOT go Back to bed or wait to see if you get better-TIME IS BRAIN. Warning Signs of HEART ATTACK Call 911 if you have these symptoms: 
? Chest discomfort. Most heart attacks involve discomfort in the center of the chest that lasts more than a few minutes, or that goes away and comes back. It can feel like uncomfortable pressure, squeezing, fullness, or pain. ? Discomfort in other areas of the upper body. Symptoms can include pain or discomfort in one or both arms, the back, neck, jaw, or stomach. ? Shortness of breath with or without chest discomfort. ? Other signs may include breaking out in a cold sweat, nausea, or lightheadedness. Don't wait more than five minutes to call [address]! Fast action can save your life. Calling 911 is almost always the fastest way to get lifesaving treatment. Emergency Medical Services staff can begin treatment when they arrive  up to an hour sooner than if someone gets to the hospital by car. The discharge information has been reviewed with the patient, spouse and daughter. The patient, spouse and daughter verbalized understanding. Discharge medications reviewed with the patient, spouse and daughter and appropriate educational materials and side effects teaching were provided. Patient armband removed and shredded Chart Review Routing History No Routing History on File

## 2017-05-16 NOTE — PROGRESS NOTES
05/16/17 1734   Critical Result Types   Type of Critical Result Laboratory   Critical Lab Result Types   Critical Lab Value Lactic Acid   Lactic Acid Value 4.8   Notification Information   Notified By (Name) Peggy Velasquez   Time of Critical Result Notification [phone] [id number]   Verbal Readback Provided Yes   Provider Notification   Was Provider Notified Yes   Name of Provider Dr Tarah Dawkins   Provider Roxene Mask Yes   Time Provider Notified [phone]   Date Provider Notified 05/16/17   Were Orders Received Yes     NS @75 mL/hr continuous.

## 2017-05-16 NOTE — H&P
History & Physical    Patient: Quique Stapleton MRN: [id number]  CSN: [id number]    YOB: 1940  Age: 68 y.o. Sex: male      DOA: 5/16/2017  Primary Care Provider:  Not On File [facility]      Assessment/Plan     Patient Active Problem List   Diagnosis Code    Pneumonia J18.9    Chronic obstructive pulmonary disease ([facility] J44.9    BPH (benign prostatic hyperplasia) N40.0    Atrial fibrillation (HCC) I48.91    Sepsis ([facility] A41.9       Admit to telemetry    sepsis    Pneumonia - start on levaquin, get respiratory cultures. Follow blood cultures. COPD - continue advair and spiriva, start on neb treatment as needed. A-fib - rate controlled. On beta blocker, need to find what blood thinner he is at home and update. DVT prophylaxis        CC: cough, SOB       HPI:     Quique Stapleton is a 68 y.o. male who has past history of A-fib, COPD, BPH, HTN presents to ER with concerns of SOB and cough. Patient is [place] speaking and he is visiting from [facility]. He can speak little english. He reports that he has been feeling SOB and cough since last night, associated with right sided chest pain. He also had fever and chills. He denies any n/v, palpitations. He quit smoking in 2000. In ER CXR showed RML, RLL pneumonia, wbc elevated at 23, lactate elevated at 3.7. He is being admitted for further management. Past Medical History:   Diagnosis Date    Atrial fibrillation ([facility]     BPH (benign prostatic hyperplasia)     Chronic obstructive pulmonary disease ([facility]        Past Surgical History:   Procedure Laterality Date    HX HIP REPLACEMENT         History reviewed. No pertinent family history.     Social History     Social History    Marital status:      Spouse name: N/A    Number of children: N/A    Years of education: N/A     Social History Main Topics    Smoking status: Former Smoker    Smokeless tobacco: None    Alcohol use Yes    Drug use: None    Sexual activity: Not Asked     Other Topics Concern    None     Social History Narrative    None       Prior to Admission medications    Medication Sig Start Date End Date Taking? Authorizing Provider   torsemide (DEMADEX) 5 mg tablet Take 5 mg by mouth daily. Yes Steffany Joshi MD   spironolactone (ALDACTONE) 50 mg tablet Take 50 mg by mouth daily. Yes Steffany Joshi MD   nebivolol (BYSTOLIC) 5 mg tablet Take 5 mg by mouth daily. Yes Steffany Joshi MD   simvastatin (ZOCOR) 40 mg tablet Take 40 mg by mouth nightly. Yes Steffany Joshi MD   tiotropium (SPIRIVA WITH HANDIHALER) 18 mcg inhalation capsule Take  by inhalation daily. Yes Steffany Joshi MD   tamsulosin (FLOMAX) 0.4 mg capsule Take 0.4 mg by mouth daily. Yes Steffany Joshi MD   fluticasone-salmeterol (ADVAIR DISKUS) 100-50 mcg/dose diskus inhaler Take 1 Puff by inhalation every twelve (12) hours. Yes Steffany Joshi MD       No Known Allergies    Review of Systems  Gen: No  malaise, weight loss/gain. Heent: No headache, rhinorrhea, epistaxis, ear pain, hearing loss, sinus pain, neck pain/stiffness, sore throat. Heart: No see above, palpitations, ORDOÑEZ, pnd, or orthopnea. Resp: see above   GI: No nausea, vomiting, diarrhea, constipation, melena or hematochezia. : No urinary obstruction, dysuria or hematuria. Derm: No rash, new skin lesion or pruritis. Musc/skeletal: no bone or joint complains. Vasc: No edema, cyanosis or claudication. Endo: No heat/cold intolerance, no polyuria,polydipsia or polyphagia. Neuro: No unilateral weakness, numbness, tingling. No seizures. Heme: No easy bruising or bleeding.           Physical Exam:     Physical Exam:  Visit Vitals    /86 (BP 1 Location: Left arm)    Pulse (!) 120    Temp 98 °F (36.7 °C)    Resp 20    Ht 5' 10.47\" (1.79 m)    Wt 98.1 kg (216 lb 4.8 oz)    SpO2 95%    BMI 30.62 kg/m2      O2 Device: Room air    Temp (24hrs), Av.6 °F (37 °C), Min:98 °F (36.7 °C), Max:99.2 °F (37.3 °C)             General:  Awake, cooperative, no distress. Head:  Normocephalic, without obvious abnormality, atraumatic. Eyes:  Conjunctivae/corneas clear, sclera anicteric, PERRL, EOMs intact. Nose: Nares normal. No drainage or sinus tenderness. Throat: Lips, mucosa, and tongue normal.    Neck: Supple, symmetrical, trachea midline, no adenopathy. Lungs:   Crackles right lower lungs. Heart:  Regular rate and rhythm, S1, S2 normal, no murmur, click, rub or gallop. Abdomen: Soft, non-tender. Bowel sounds normal. No masses,  No organomegaly. Extremities: Extremities normal, atraumatic, no cyanosis or edema. Capillary refill normal.   Pulses: 2+ and symmetric all extremities. Skin: Skin color pink/pale/mottled/flushed, turgor normal. No rashes or lesions   Neurologic: CNII-XII intact. No focal motor or sensory deficit.        Labs Reviewed:    CMP:   Lab Results   Component Value Date/Time     05/16/2017 10:35 AM    K 4.8 05/16/2017 10:35 AM    CL 99 (L) 05/16/2017 10:35 AM    CO2 27 05/16/2017 10:35 AM    AGAP 12 05/16/2017 10:35 AM     (H) 05/16/2017 10:35 AM    BUN 25 (H) 05/16/2017 10:35 AM    CREA 1.39 (H) 05/16/2017 10:35 AM    GFRAA >60 05/16/2017 10:35 AM    GFRNA 50 (L) 05/16/2017 10:35 AM    CA 9.5 05/16/2017 10:35 AM    ALB 4.0 05/16/2017 10:35 AM    TP 8.3 (H) 05/16/2017 10:35 AM    GLOB 4.3 (H) 05/16/2017 10:35 AM    AGRAT 0.9 05/16/2017 10:35 AM    SGOT 19 05/16/2017 10:35 AM    ALT 41 05/16/2017 10:35 AM     CBC:   Lab Results   Component Value Date/Time    WBC 23.1 (H) 05/16/2017 10:35 AM    HGB 15.4 05/16/2017 10:35 AM    HCT 46.3 05/16/2017 10:35 AM     05/16/2017 10:35 AM     Recent Results (from the past 24 hour(s))   EKG, 12 LEAD, INITIAL    Collection Time: 05/16/17 10:32 AM   Result Value Ref Range    Ventricular Rate 102 BPM    Atrial Rate 98 BPM    QRS Duration 74 ms    Q-T Interval 314 ms    QTC Calculation (Bezet) 409 ms    Calculated R Axis 76 degrees    Calculated T Axis -137 degrees    Diagnosis       Atrial fibrillation with rapid ventricular response  ST & T wave abnormality, consider lateral ischemia or digitalis effect  Abnormal ECG  No previous ECGs available     CBC WITH AUTOMATED DIFF    Collection Time: 05/16/17 10:35 AM   Result Value Ref Range    WBC 23.1 (H) 4.6 - 13.2 K/uL    RBC 5.09 4.70 - 5.50 M/uL    HGB 15.4 13.0 - 16.0 g/dL    HCT 46.3 36.0 - 48.0 %    MCV 91.0 74.0 - 97.0 FL    MCH 30.3 24.0 - 34.0 PG    MCHC 33.3 31.0 - 37.0 g/dL    RDW 15.6 (H) 11.6 - 14.5 %    PLATELET 363 953 - 170 K/uL    MPV 10.4 9.2 - 11.8 FL    NEUTROPHILS 82 (H) 42 - 75 %    BAND NEUTROPHILS 8 (H) 0 - 5 %    LYMPHOCYTES 5 (L) 20 - 51 %    MONOCYTES 5 2 - 9 %    EOSINOPHILS 0 0 - 5 %    BASOPHILS 0 0 - 3 %    ABS. NEUTROPHILS 18.9 (H) 1.8 - 8.0 K/UL    ABS. LYMPHOCYTES 1.2 0.8 - 3.5 K/UL    ABS. MONOCYTES 1.2 (H) 0 - 1.0 K/UL    ABS. EOSINOPHILS 0.0 0.0 - 0.4 K/UL    ABS. BASOPHILS 0.0 0.0 - 0.1 K/UL    RBC COMMENTS ANISOCYTOSIS  1+        DF MANUAL     METABOLIC PANEL, COMPREHENSIVE    Collection Time: 05/16/17 10:35 AM   Result Value Ref Range    Sodium 138 136 - 145 mmol/L    Potassium 4.8 3.5 - 5.5 mmol/L    Chloride 99 (L) 100 - 108 mmol/L    CO2 27 21 - 32 mmol/L    Anion gap 12 3.0 - 18 mmol/L    Glucose 197 (H) 74 - 99 mg/dL    BUN 25 (H) 7.0 - 18 MG/DL    Creatinine 1.39 (H) 0.6 - 1.3 MG/DL    BUN/Creatinine ratio 18 12 - 20      GFR est AA >60 >60 ml/min/1.73m2    GFR est non-AA 50 (L) >60 ml/min/1.73m2    Calcium 9.5 8.5 - 10.1 MG/DL    Bilirubin, total 0.9 0.2 - 1.0 MG/DL    ALT (SGPT) 41 16 - 61 U/L    AST (SGOT) 19 15 - 37 U/L    Alk.  phosphatase 71 45 - 117 U/L    Protein, total 8.3 (H) 6.4 - 8.2 g/dL    Albumin 4.0 3.4 - 5.0 g/dL    Globulin 4.3 (H) 2.0 - 4.0 g/dL    A-G Ratio 0.9 0.8 - 1.7     CARDIAC PANEL,(CK, CKMB & TROPONIN)    Collection Time: 05/16/17 10:35 AM   Result Value Ref Range     39 - 308 U/L    CK - MB 2.9 <3.6 ng/ml    CK-MB Index 2.3 0.0 - 4.0 % Troponin-I, Qt. <0.02 0.00 - 0.06 NG/ML   PRO-BNP    Collection Time: 05/16/17 10:35 AM   Result Value Ref Range    NT pro-BNP 1424 0 - 1800 PG/ML   LACTIC ACID, PLASMA    Collection Time: 05/16/17 12:00 PM   Result Value Ref Range    Lactic acid 3.7 (HH) 0.4 - 2.0 MMOL/L   GLUCOSE, POC    Collection Time: 05/16/17  4:06 PM   Result Value Ref Range    Glucose (POC) 196 (H) 70 - 110 mg/dL       Procedures/imaging: see electronic medical records for all procedures/Xrays and details which were not copied into this note but were reviewed prior to creation of Plan    70 minutes of critical care time spent in the direct evaluation and treatment of this high risk patient. The reason for providing this level of medical care for this critically ill patient was due a critical illness that impaired one or more vital organ systems such that there was a high probability of imminent or life threatening deterioration in the patients condition. This care involved high complexity decision making to assess, manipulate, and support vital system functions, to treat this degreee vital organ system failure and to prevent further life threatening deterioration of the patients condition.       CC: Not On File [facility]

## 2017-05-16 NOTE — ED NOTES
Sepsis bundle discussed with MEAGHAN Begum, she states pt does not have hypotension and does not need sepsis fluid bolus.

## 2017-05-17 PROBLEM — E11.9 CONTROLLED TYPE 2 DIABETES MELLITUS WITHOUT COMPLICATION (HCC): Status: ACTIVE | Noted: 2017-05-17

## 2017-05-17 LAB
ANION GAP BLD CALC-SCNC: 7 MMOL/L (ref 3–18)
ATRIAL RATE: 98 BPM
BUN SERPL-MCNC: 24 MG/DL (ref 7–18)
BUN/CREAT SERPL: 20 (ref 12–20)
CALCIUM SERPL-MCNC: 8.3 MG/DL (ref 8.5–10.1)
CALCULATED R AXIS, ECG10: 76 DEGREES
CALCULATED T AXIS, ECG11: -137 DEGREES
CHLORIDE SERPL-SCNC: 104 MMOL/L (ref 100–108)
CO2 SERPL-SCNC: 26 MMOL/L (ref 21–32)
CREAT SERPL-MCNC: 1.19 MG/DL (ref 0.6–1.3)
DIAGNOSIS, 93000: NORMAL
ERYTHROCYTE [DISTWIDTH] IN BLOOD BY AUTOMATED COUNT: 15.8 % (ref 11.6–14.5)
EST. AVERAGE GLUCOSE BLD GHB EST-MCNC: 143 MG/DL
GLUCOSE BLD STRIP.AUTO-MCNC: 162 MG/DL (ref 70–110)
GLUCOSE BLD STRIP.AUTO-MCNC: 192 MG/DL (ref 70–110)
GLUCOSE BLD STRIP.AUTO-MCNC: 194 MG/DL (ref 70–110)
GLUCOSE BLD STRIP.AUTO-MCNC: 204 MG/DL (ref 70–110)
GLUCOSE SERPL-MCNC: 206 MG/DL (ref 74–99)
HBA1C MFR BLD: 6.6 % (ref 4.5–5.6)
HCT VFR BLD AUTO: 40.2 % (ref 36–48)
HGB BLD-MCNC: 13.2 G/DL (ref 13–16)
INR PPP: 2.5 (ref 0.8–1.2)
LACTATE SERPL-SCNC: 1.8 MMOL/L (ref 0.4–2)
LACTATE SERPL-SCNC: 2 MMOL/L (ref 0.4–2)
LACTATE SERPL-SCNC: 2.5 MMOL/L (ref 0.4–2)
MCH RBC QN AUTO: 29.6 PG (ref 24–34)
MCHC RBC AUTO-ENTMCNC: 32.8 G/DL (ref 31–37)
MCV RBC AUTO: 90.1 FL (ref 74–97)
PLATELET # BLD AUTO: 175 K/UL (ref 135–420)
PMV BLD AUTO: 10.4 FL (ref 9.2–11.8)
POTASSIUM SERPL-SCNC: 4.7 MMOL/L (ref 3.5–5.5)
PROTHROMBIN TIME: 25.5 SEC (ref 11.5–15.2)
Q-T INTERVAL, ECG07: 314 MS
QRS DURATION, ECG06: 74 MS
QTC CALCULATION (BEZET), ECG08: 409 MS
RBC # BLD AUTO: 4.46 M/UL (ref 4.7–5.5)
SODIUM SERPL-SCNC: 137 MMOL/L (ref 136–145)
VENTRICULAR RATE, ECG03: 102 BPM
WBC # BLD AUTO: 20.4 K/UL (ref 4.6–13.2)

## 2017-05-17 PROCEDURE — 36415 COLL VENOUS BLD VENIPUNCTURE: CPT | Performed by: HOSPITALIST

## 2017-05-17 PROCEDURE — 74011250637 HC RX REV CODE- 250/637: Performed by: HOSPITALIST

## 2017-05-17 PROCEDURE — 83605 ASSAY OF LACTIC ACID: CPT | Performed by: HOSPITALIST

## 2017-05-17 PROCEDURE — 77010033678 HC OXYGEN DAILY

## 2017-05-17 PROCEDURE — 74011636637 HC RX REV CODE- 636/637: Performed by: FAMILY MEDICINE

## 2017-05-17 PROCEDURE — 74011250637 HC RX REV CODE- 250/637: Performed by: FAMILY MEDICINE

## 2017-05-17 PROCEDURE — 65660000000 HC RM CCU STEPDOWN

## 2017-05-17 PROCEDURE — 85027 COMPLETE CBC AUTOMATED: CPT | Performed by: HOSPITALIST

## 2017-05-17 PROCEDURE — 94640 AIRWAY INHALATION TREATMENT: CPT

## 2017-05-17 PROCEDURE — 74011250636 HC RX REV CODE- 250/636: Performed by: HOSPITALIST

## 2017-05-17 PROCEDURE — 74011000250 HC RX REV CODE- 250: Performed by: HOSPITALIST

## 2017-05-17 PROCEDURE — 74011250636 HC RX REV CODE- 250/636: Performed by: PHYSICIAN ASSISTANT

## 2017-05-17 PROCEDURE — 83036 HEMOGLOBIN GLYCOSYLATED A1C: CPT | Performed by: FAMILY MEDICINE

## 2017-05-17 PROCEDURE — 85610 PROTHROMBIN TIME: CPT | Performed by: HOSPITALIST

## 2017-05-17 PROCEDURE — 82962 GLUCOSE BLOOD TEST: CPT

## 2017-05-17 PROCEDURE — 80048 BASIC METABOLIC PNL TOTAL CA: CPT | Performed by: HOSPITALIST

## 2017-05-17 PROCEDURE — 74011250636 HC RX REV CODE- 250/636: Performed by: FAMILY MEDICINE

## 2017-05-17 PROCEDURE — 74011000250 HC RX REV CODE- 250: Performed by: FAMILY MEDICINE

## 2017-05-17 RX ORDER — WARFARIN SODIUM 5 MG/1
5 TABLET ORAL ONCE
Status: COMPLETED | OUTPATIENT
Start: 2017-05-17 | End: 2017-05-17

## 2017-05-17 RX ORDER — IPRATROPIUM BROMIDE AND ALBUTEROL SULFATE 2.5; .5 MG/3ML; MG/3ML
3 SOLUTION RESPIRATORY (INHALATION)
Status: COMPLETED | OUTPATIENT
Start: 2017-05-17 | End: 2017-05-17

## 2017-05-17 RX ORDER — WARFARIN SODIUM 5 MG/1
5 TABLET ORAL EVERY EVENING
Status: DISCONTINUED | OUTPATIENT
Start: 2017-05-17 | End: 2017-05-17

## 2017-05-17 RX ORDER — WARFARIN 3 MG/1
3 TABLET ORAL ONCE
Status: DISCONTINUED | OUTPATIENT
Start: 2017-05-17 | End: 2017-05-17

## 2017-05-17 RX ORDER — METFORMIN HYDROCHLORIDE 500 MG/1
TABLET ORAL 2 TIMES DAILY WITH MEALS
COMMUNITY

## 2017-05-17 RX ORDER — LEVOFLOXACIN 750 MG/1
750 TABLET ORAL EVERY 24 HOURS
Status: DISCONTINUED | OUTPATIENT
Start: 2017-05-18 | End: 2017-05-18 | Stop reason: HOSPADM

## 2017-05-17 RX ADMIN — TORSEMIDE 5 MG: 20 TABLET ORAL at 10:09

## 2017-05-17 RX ADMIN — IPRATROPIUM BROMIDE AND ALBUTEROL SULFATE 3 ML: .5; 3 SOLUTION RESPIRATORY (INHALATION) at 05:19

## 2017-05-17 RX ADMIN — FLUTICASONE FUROATE AND VILANTEROL TRIFENATATE 1 PUFF: 100; 25 POWDER RESPIRATORY (INHALATION) at 10:18

## 2017-05-17 RX ADMIN — METHYLPREDNISOLONE SODIUM SUCCINATE 40 MG: 40 INJECTION, POWDER, FOR SOLUTION INTRAMUSCULAR; INTRAVENOUS at 03:13

## 2017-05-17 RX ADMIN — INSULIN LISPRO 2 UNITS: 100 INJECTION, SOLUTION INTRAVENOUS; SUBCUTANEOUS at 12:50

## 2017-05-17 RX ADMIN — Medication 40 MG: at 22:45

## 2017-05-17 RX ADMIN — LEVOFLOXACIN 750 MG: 5 INJECTION, SOLUTION INTRAVENOUS at 12:50

## 2017-05-17 RX ADMIN — TAMSULOSIN HYDROCHLORIDE 0.4 MG: 0.4 CAPSULE ORAL at 10:10

## 2017-05-17 RX ADMIN — NEBIVOLOL HYDROCHLORIDE 5 MG: 5 TABLET ORAL at 10:10

## 2017-05-17 RX ADMIN — PREDNISONE 50 MG: 10 TABLET ORAL at 12:52

## 2017-05-17 RX ADMIN — HEPARIN SODIUM 5000 UNITS: 5000 INJECTION, SOLUTION INTRAVENOUS; SUBCUTANEOUS at 03:13

## 2017-05-17 RX ADMIN — IPRATROPIUM BROMIDE AND ALBUTEROL SULFATE 3 ML: .5; 3 SOLUTION RESPIRATORY (INHALATION) at 22:54

## 2017-05-17 RX ADMIN — INSULIN LISPRO 4 UNITS: 100 INJECTION, SOLUTION INTRAVENOUS; SUBCUTANEOUS at 06:53

## 2017-05-17 RX ADMIN — Medication 10 ML: at 22:00

## 2017-05-17 RX ADMIN — IPRATROPIUM BROMIDE AND ALBUTEROL SULFATE 3 ML: .5; 3 SOLUTION RESPIRATORY (INHALATION) at 15:37

## 2017-05-17 RX ADMIN — UMECLIDINIUM 1 PUFF: 62.5 AEROSOL, POWDER ORAL at 10:18

## 2017-05-17 RX ADMIN — Medication 10 ML: at 17:02

## 2017-05-17 RX ADMIN — ROFLUMILAST 500 MCG: 500 TABLET ORAL at 10:09

## 2017-05-17 RX ADMIN — IPRATROPIUM BROMIDE AND ALBUTEROL SULFATE 3 ML: .5; 3 SOLUTION RESPIRATORY (INHALATION) at 11:16

## 2017-05-17 RX ADMIN — WARFARIN SODIUM 5 MG: 5 TABLET ORAL at 17:36

## 2017-05-17 RX ADMIN — INSULIN LISPRO 2 UNITS: 100 INJECTION, SOLUTION INTRAVENOUS; SUBCUTANEOUS at 17:02

## 2017-05-17 RX ADMIN — SPIRONOLACTONE 50 MG: 25 TABLET, FILM COATED ORAL at 10:09

## 2017-05-17 RX ADMIN — INSULIN LISPRO 2 UNITS: 100 INJECTION, SOLUTION INTRAVENOUS; SUBCUTANEOUS at 20:45

## 2017-05-17 RX ADMIN — Medication 10 ML: at 06:51

## 2017-05-17 RX ADMIN — METHYLPREDNISOLONE SODIUM SUCCINATE 40 MG: 40 INJECTION, POWDER, FOR SOLUTION INTRAMUSCULAR; INTRAVENOUS at 10:11

## 2017-05-17 RX ADMIN — IPRATROPIUM BROMIDE AND ALBUTEROL SULFATE 3 ML: .5; 3 SOLUTION RESPIRATORY (INHALATION) at 17:57

## 2017-05-17 RX ADMIN — IPRATROPIUM BROMIDE AND ALBUTEROL SULFATE 3 ML: .5; 3 SOLUTION RESPIRATORY (INHALATION) at 08:20

## 2017-05-17 NOTE — PROGRESS NOTES
Hospitalist Progress Note    Patient: Vasu Villegas MRN: [id number]  CSN: [id number]    YOB: 1940  Age: 68 y.o.   Sex: male    DOA: 5/16/2017 LOS:  LOS: 1 day          Chief Complaint:    Cough, dyspnea    Assessment/Plan     Hospital Problems  Never Reviewed          Codes Class Noted POA    Pneumonia ICD-10-CM: J18.9  ICD-9-CM: 952  5/16/2017 Yes        Chronic obstructive pulmonary disease ([facility] ICD-10-CM: J44.9  ICD-9-CM: 80  Unknown Yes        BPH (benign prostatic hyperplasia) ICD-10-CM: N40.0  ICD-9-CM: 600.00  Unknown Yes        Atrial fibrillation ([facility] ICD-10-CM: I48.91  ICD-9-CM: 427.31  Unknown Yes        * (Principal)Sepsis ([facility] ICD-10-CM: A41.9  ICD-9-CM: 038.9, 995.91  5/16/2017 Yes              Sepsis  - Source: Right middle and lower lobe pneumonia, community acquired  - Abx: Levaquin (first dose 5/16/17 @ 1300)  - Culture data:   - BCx x 1 5/16/17: NGTD   - Resp Cx x 1 5/16/17: pending  - Severity markers:   - WBC with minimal drop (likely dilutional with commensurate drop in Hgb)   - No diff to show clearance of bandemia   - Initial lactate elevation cleared w/ IV fluids  - Sepsis bundle    CAP  - will need to repeat CT chest w/ contrast following treatment to ensure resolution of CT findings  - could not exclude mass in setting of acute infection    WENCESLAO  - Resolved w/ IV hydration  - hold diuretics     COPD  - IV antibiotics as above + steroids for COPD  - change to PO steroids today  - PO abx after 24h IV therapy due to sepsis classification  - Breo, daliresp, incruse  - prn duo-nebs    Afib  - Rate control: bystolic 5mg PO daily  - also taking Digoxin equivalent as outpatient, holding now  - Anticoagulated: marcumar 3mg po qHS at home (coumadin derivative)  - INR 2.5  - start coumadin here w/ pharmacy dosing  - would resume home meds on discharge as they seem to be working well    HTN  - BP goal <140/90    DM2  - SSI while inpatient  - resume metformin 500mg PO BID on discharge    PT/OT     CM for DC planning    FENGI: saline lock; coumadin; cardiac diet; no gi prophylaxis indicated    Dispo: transition to PO steroids today, PO abx tomorrow; if stable on RA and PO meds, DC anticipated Friday       Subjective:    Feeling much better today      Review of systems:    Constitutional: denies fevers, chills, myalgias  Respiratory: denies SOB, cough  Cardiovascular: denies chest pain, palpitations  Gastrointestinal: denies nausea, vomiting, diarrhea      Vital signs/Intake and Output:  Visit Vitals    /65    Pulse 77    Temp 98.3 °F (36.8 °C)    Resp 19    Ht 5' 10.47\" (1.79 m)    Wt 99.2 kg (218 lb 11.1 oz)    SpO2 96%    BMI 30.96 kg/m2     Current Shift:  05/17 0701 - 05/17 1900  In: 240 [P.O.:240]  Out: -   Last three shifts:  05/15 1901 - 05/17 0700  In: 480 [P.O.:480]  Out: 550 [Urine:550]    Exam:    General: Well developed, alert, NAD, OX3  Head/Neck: NCAT, supple, No masses, No lymphadenopathy  CVS:Regular rate and rhythm, no M/R/G, S1/S2 heard, no thrill  Lungs:Coarse RLL otherwise clear to auscultation bilaterally  Abdomen: Soft, Nontender, No distention, Normal Bowel sounds, No hepatomegaly  Extremities: No C/C/E, pulses palpable 2+  Skin:normal texture and turgor, no rashes, no lesions  Neuro:grossly normal , follows commands  Psych:appropriate                Labs: Results:       Chemistry Recent Labs      05/17/17   0525 05/16/17   1035   GLU  206*  197*   NA  137  138   K  4.7  4.8   CL  104  99*   CO2  26  27   BUN  24*  25*   CREA  1.19  1.39*   CA  8.3*  9.5   AGAP  7  12   BUCR  20  18   AP   --   71   TP   --   8.3*   ALB   --   4.0   GLOB   --   4.3*   AGRAT   --   0.9      CBC w/Diff Recent Labs      05/17/17   0525  05/16/17   1035   WBC  20.4*  23.1*   RBC  4.46*  5.09   HGB  13.2  15.4   HCT  40.2  46.3   PLT  175  238   GRANS   --   82*   LYMPH   --   5*   EOS   --   0      Cardiac Enzymes Recent Labs      05/16/17   1035   CPK  128   CKND1  2.3 Coagulation Recent Labs      05/17/17   0525   PTP  25.5*   INR  2.5*       Lipid Panel No results found for: CHOL, CHOLPOCT, CHOLX, CHLST, CHOLV, [id number], HDL, LDL, NLDLCT, DLDL, LDLC, DLDLP, [id number], VLDLC, VLDL, TGL, TGLX, TRIGL, [id number], TRIGP, TGLPOCT, [id number], CHHD, CHHDX   BNP No results for input(s): BNPP in the last 72 hours.    Liver Enzymes Recent Labs      05/16/17   1035   TP  8.3*   ALB  4.0   AP  71   SGOT  19      Thyroid Studies No results found for: T4, T3U, TSH, TSHEXT     Procedures/imaging: see electronic medical records for all procedures/Xrays and details which were not copied into this note but were reviewed prior to creation of Ngozia [id number], DO

## 2017-05-17 NOTE — PROGRESS NOTES
Pharmacy Dosing Services: Warfarin     Consult for Warfarin Dosing by Pharmacy by Dr. Steffi Johns provided for this 68 y.o.  male , for indication of Atrial Fibrillation. Dose to achieve an INR goal of 2-3    Pt from [place]. He takes Marcumar ( Phenprocoumon ) 3 mg daily which is a derivative of Warfarin. Marcumar 3 mg is approximately equivalent to Warfarin 7 mg. INR = 2.5 today   Will start with Warfarin 5 mg due to Levaquin interaction      Order entered for  Warfarin 5 mg ordered to be given today at 18:00. Significant drug interactions: Levaquin   PT/INR Lab Results   Component Value Date/Time    INR 2.5 05/17/2017 05:25 AM      Platelets Lab Results   Component Value Date/Time    PLATELET 044 09/71/3567 05:25 AM      H/H     Lab Results   Component Value Date/Time    HGB 13.2 05/17/2017 05:25 AM        Warfarin Administrations (last 168 hours)       None          Pharmacy to follow daily and will provide subsequent Warfarin dosing based on clinical status.   EMLISSA Urbina [facility])  Contact information [phone]

## 2017-05-17 NOTE — ROUTINE PROCESS
Bedside and Verbal shift change report given to Levi Ruggiero (oncoming nurse) by Joaquim (offgoing nurse). Report included the following information SBAR, Kardex, Intake/Output, MAR, Recent Results and Cardiac Rhythm Afib.        MOnitor heart rate

## 2017-05-17 NOTE — PROGRESS NOTES
Readmission Risk Assessment: Low Risk and MSSP/Good Help ACO patients    RRAT Score: 1 - 12 (12)    Initial Assessment: Chart reviewed, noted 68 yr old self pay male admitted with pneumonia, sepsis. Met with pt who stated that: he was here from [facility] visiting his dtr Omari Roberto; he had an MD in [place]; he would return to his dtr's house at discharge; after his visit, he would return to [place]; he was not expecting to have any d/c needs. CMgr will be available should d/c needs arise. Emergency Contact: Omari Roberto dtr    Pertinent Medical Hx: see H&P    [facility] [id number] [facility]     DME:     Low Risk Care Transition Plan:  1. Evaluate for [facility] or [facility], Critical access hospital care coordination of resources  2. Involve patient/caregiver in assessment, planning, education and implement of intervention. 3. CM daily patient care huddles/interdisciplinary rounds. 4. PCP/Specialist appointment within 7 - 10 days made prior to discharge. 5. Facilitate transportation and logistics for follow-up appointments. 6. Handoff to [address] Nurse Navigator or PCP practice.

## 2017-05-17 NOTE — CDMP QUERY
Please clarify if this patient is being treated/managed for:    =>Severe sepsis in the setting of sepsis and pneumonia with lactic acid 3.7-4.8  =>Other Explanation of clinical findings  =>Unable to Determine (no explanation of clinical findings)    The medical record reflects the following:    Risk:  pneumonia, h/o copd    Clinical Indicators: Adm with sepsis and PNA. On admission, wbc 23.1, 82 neuts, 8 bands, lactic acid 3.7-4.8. Treatment: IVF, IV abx. Please clarify and document your clinical opinion in the progress notes and discharge summary including the definitive and/or presumptive diagnosis, (suspected or probable), related to the above clinical findings. Please include clinical findings supporting your diagnosis. If you DECLINE this query or would like to communicate with [facility], please utilize the \"[employer] message box\" at the TOP of the Progress Note on the right.       Thank you,    Tonnie Dec RN Jennie Goldmann RN [address]

## 2017-05-17 NOTE — DIABETES MGMT
GLYCEMIC CONTROL SCREENING INITIATED:  -BG > 200mg/dL on AM labs  Noted:  Steroids on board, POCT + corrective coverage orders in place  -no documented h/o diabetes, recommend obtain A1c to confirm steroid induced (orders entered per protocol, Dr. Connie Baez  -if steroids continue may benefit from a basal/bolus regimen      Lab Results   Component Value Date/Time    Glucose 206 05/17/2017 05:25 AM         [x]  Glucose values exceeding inpatient target range: -180mg/dl            non-ICU 70-140mg/dl      []  Patient meets criteria for pre-diabetes   HbA1c = 5.7-6.4%      []  Patient meets criteria for diabetes HbA1c ? 6.5%      []  Recommend discontinuing oral anti-diabetic medications until discharge. []  Recommend basal insulin per IP Insulin order set. []  Recommend meal time insulin per IP Insulin order set. [x]  Recommend corrective insulin per IP Insulin order set. [x]  Standard insulin scale  i[]  Very insuln resistant scale       []  Patient meets criteria for IV Insulin Infusion/GlucoStabilizer order set. []  Patient has had a hypoglycemic event, recommend      [x]  Recommend blood glucose monitoring while patient is on steroids. []  Patient will need prescription for glucometer, test strips and lancets. []  Recommend carbohydrate controlled diabetic diet. []  Diabetes Self-Management class schedule provided and patient encouraged to attend.     [] Other    Junious All RN, MS  Glycemic Control Team

## 2017-05-17 NOTE — PROGRESS NOTES
1925: Assumed patient care, he was alert and oriented to person, place, time and situation. Respiratory status was stable on 2L nasal cannula. Vital signs were stable. MEWS score was a two. Patient denied any nausea vomiting dizziness or anxiety. White board was updated and explained. Bed was locked and in lowest position. Call bell, water and personal belongings were within reach. Patient had no questions, comments or concerns after bedside shift report. 0700: Patient had an uneventful shift. Respiratory status, vital signs and MEWS score remained stable. Patient was resting quietly with no signs of distress noted. Bed locked and in lowest position. Call bell water and personal belongings were within reach. Patient had no questions, comments or concerns after bedside shift report.  Bedside report given to Erlinda Whitten R.N.

## 2017-05-17 NOTE — PROGRESS NOTES
Spoke with Dr Priya Plaza regarding home medication Marcumar 3 mg and Digimerck minor 0,07 (medication from [place]). Dr Yesica Hendrix gave orders to resume home medications, however Pharmacy can not verify any medications from [facility]. Patient will need to take substitute medications, but patient is refusing to take substitute Marcumar. Dr Priya Plaza ordered for md to address medications tomorrow. Patient took home dose of Marcumar for Afib tonight already, INR will be checked in the morning. Patient in respiratory distress, received one time order of solumedrol in the ED. Orders received for solumedrol 40 mg every 6 hours, Lispro sliding scale coverage with POC ac/hs. Monitor for fluid overload, patient receiving fluids for elevated Lactic acid levels/ sepsis protocol.

## 2017-05-17 NOTE — PROGRESS NOTES
Patient had a uneventful shift throughout the day. IV steroid changed to po, scheduled neb tx with Levaquin Q day. O2 NC for comfort during the day, later discontinued by respiratory, sats remain in the high 90's. Patient complaining of shortness of breath with a frequent cough around, requesting \"inhaler\", Dr Antwon Lang on unit, ordered a extra Duo-neb one time. Respiratory notified, will continue to monitor.

## 2017-05-17 NOTE — ROUTINE PROCESS
Bedside and Verbal shift change report given to KALINA Fitzpatrick (oncoming nurse) by Danyelle (offgoing nurse). Report included the following information SBAR, Kardex, Intake/Output, MAR, Recent Results and Cardiac Rhythm Afib.

## 2017-05-17 NOTE — PROGRESS NOTES
Patient took home medication Marcumar 3 mg po scheduled every evening (PTA) while in the hospital. Dr Patsy Pan came to the bedside and was notified. PT/INR will be checked in the AM, heparin held.

## 2017-05-18 VITALS
HEIGHT: 70 IN | DIASTOLIC BLOOD PRESSURE: 64 MMHG | SYSTOLIC BLOOD PRESSURE: 112 MMHG | HEART RATE: 86 BPM | WEIGHT: 218.7 LBS | OXYGEN SATURATION: 96 % | TEMPERATURE: 97.6 F | BODY MASS INDEX: 31.31 KG/M2 | RESPIRATION RATE: 21 BRPM

## 2017-05-18 LAB
ALBUMIN SERPL BCP-MCNC: 2.6 G/DL (ref 3.4–5)
ALBUMIN/GLOB SERPL: 0.7 {RATIO} (ref 0.8–1.7)
ALP SERPL-CCNC: 49 U/L (ref 45–117)
ALT SERPL-CCNC: 52 U/L (ref 16–61)
ANION GAP BLD CALC-SCNC: 7 MMOL/L (ref 3–18)
AST SERPL W P-5'-P-CCNC: 29 U/L (ref 15–37)
BASOPHILS # BLD AUTO: 0 K/UL (ref 0–0.06)
BASOPHILS # BLD: 0 % (ref 0–2)
BILIRUB SERPL-MCNC: 0.5 MG/DL (ref 0.2–1)
BUN SERPL-MCNC: 35 MG/DL (ref 7–18)
BUN/CREAT SERPL: 28 (ref 12–20)
CALCIUM SERPL-MCNC: 8.4 MG/DL (ref 8.5–10.1)
CHLORIDE SERPL-SCNC: 103 MMOL/L (ref 100–108)
CO2 SERPL-SCNC: 25 MMOL/L (ref 21–32)
CREAT SERPL-MCNC: 1.25 MG/DL (ref 0.6–1.3)
DIFFERENTIAL METHOD BLD: ABNORMAL
EOSINOPHIL # BLD: 0 K/UL (ref 0–0.4)
EOSINOPHIL NFR BLD: 0 % (ref 0–5)
ERYTHROCYTE [DISTWIDTH] IN BLOOD BY AUTOMATED COUNT: 15.9 % (ref 11.6–14.5)
GLOBULIN SER CALC-MCNC: 4 G/DL (ref 2–4)
GLUCOSE BLD STRIP.AUTO-MCNC: 139 MG/DL (ref 70–110)
GLUCOSE BLD STRIP.AUTO-MCNC: 147 MG/DL (ref 70–110)
GLUCOSE BLD STRIP.AUTO-MCNC: 157 MG/DL (ref 70–110)
GLUCOSE SERPL-MCNC: 201 MG/DL (ref 74–99)
HCT VFR BLD AUTO: 37.4 % (ref 36–48)
HGB BLD-MCNC: 13 G/DL (ref 13–16)
INR PPP: 2.7 (ref 0.8–1.2)
LYMPHOCYTES # BLD AUTO: 3 % (ref 21–52)
LYMPHOCYTES # BLD: 0.5 K/UL (ref 0.9–3.6)
MCH RBC QN AUTO: 30.7 PG (ref 24–34)
MCHC RBC AUTO-ENTMCNC: 34.8 G/DL (ref 31–37)
MCV RBC AUTO: 88.4 FL (ref 74–97)
MONOCYTES # BLD: 0.5 K/UL (ref 0.05–1.2)
MONOCYTES NFR BLD AUTO: 3 % (ref 3–10)
NEUTS SEG # BLD: 16.4 K/UL (ref 1.8–8)
NEUTS SEG NFR BLD AUTO: 94 % (ref 40–73)
PLATELET # BLD AUTO: 191 K/UL (ref 135–420)
PMV BLD AUTO: 10.5 FL (ref 9.2–11.8)
POTASSIUM SERPL-SCNC: 4.1 MMOL/L (ref 3.5–5.5)
PROT SERPL-MCNC: 6.6 G/DL (ref 6.4–8.2)
PROTHROMBIN TIME: 27 SEC (ref 11.5–15.2)
RBC # BLD AUTO: 4.23 M/UL (ref 4.7–5.5)
RBC MORPH BLD: ABNORMAL
SODIUM SERPL-SCNC: 135 MMOL/L (ref 136–145)
WBC # BLD AUTO: 17.4 K/UL (ref 4.6–13.2)

## 2017-05-18 PROCEDURE — 36415 COLL VENOUS BLD VENIPUNCTURE: CPT | Performed by: FAMILY MEDICINE

## 2017-05-18 PROCEDURE — 74011250637 HC RX REV CODE- 250/637: Performed by: FAMILY MEDICINE

## 2017-05-18 PROCEDURE — 94640 AIRWAY INHALATION TREATMENT: CPT

## 2017-05-18 PROCEDURE — 74011636637 HC RX REV CODE- 636/637: Performed by: FAMILY MEDICINE

## 2017-05-18 PROCEDURE — 85025 COMPLETE CBC W/AUTO DIFF WBC: CPT | Performed by: FAMILY MEDICINE

## 2017-05-18 PROCEDURE — 74011250637 HC RX REV CODE- 250/637: Performed by: HOSPITALIST

## 2017-05-18 PROCEDURE — 74011000250 HC RX REV CODE- 250: Performed by: HOSPITALIST

## 2017-05-18 PROCEDURE — 74011250636 HC RX REV CODE- 250/636: Performed by: FAMILY MEDICINE

## 2017-05-18 PROCEDURE — 80053 COMPREHEN METABOLIC PANEL: CPT | Performed by: FAMILY MEDICINE

## 2017-05-18 PROCEDURE — 77010033678 HC OXYGEN DAILY

## 2017-05-18 PROCEDURE — 85610 PROTHROMBIN TIME: CPT | Performed by: FAMILY MEDICINE

## 2017-05-18 PROCEDURE — 82962 GLUCOSE BLOOD TEST: CPT

## 2017-05-18 RX ORDER — LEVOFLOXACIN 750 MG/1
750 TABLET ORAL EVERY 24 HOURS
Qty: 5 TAB | Refills: 0 | Status: SHIPPED | OUTPATIENT
Start: 2017-05-18 | End: 2017-05-23

## 2017-05-18 RX ORDER — WARFARIN 3 MG/1
3 TABLET ORAL ONCE
Status: DISCONTINUED | OUTPATIENT
Start: 2017-05-18 | End: 2017-05-18 | Stop reason: HOSPADM

## 2017-05-18 RX ORDER — DIPHENHYDRAMINE HYDROCHLORIDE 50 MG/ML
12.5 INJECTION, SOLUTION INTRAMUSCULAR; INTRAVENOUS ONCE
Status: COMPLETED | OUTPATIENT
Start: 2017-05-18 | End: 2017-05-18

## 2017-05-18 RX ORDER — WARFARIN 3 MG/1
3 TABLET ORAL DAILY
COMMUNITY

## 2017-05-18 RX ORDER — PREDNISONE 50 MG/1
50 TABLET ORAL
Qty: 5 TAB | Refills: 0 | Status: SHIPPED | OUTPATIENT
Start: 2017-05-18 | End: 2017-05-23

## 2017-05-18 RX ADMIN — UMECLIDINIUM 1 PUFF: 62.5 AEROSOL, POWDER ORAL at 09:01

## 2017-05-18 RX ADMIN — DIPHENHYDRAMINE HYDROCHLORIDE 12.5 MG: 50 INJECTION INTRAMUSCULAR; INTRAVENOUS at 02:00

## 2017-05-18 RX ADMIN — NEBIVOLOL HYDROCHLORIDE 5 MG: 5 TABLET ORAL at 09:00

## 2017-05-18 RX ADMIN — FLUTICASONE FUROATE AND VILANTEROL TRIFENATATE 1 PUFF: 100; 25 POWDER RESPIRATORY (INHALATION) at 09:01

## 2017-05-18 RX ADMIN — PREDNISONE 50 MG: 10 TABLET ORAL at 12:17

## 2017-05-18 RX ADMIN — ROFLUMILAST 500 MCG: 500 TABLET ORAL at 09:00

## 2017-05-18 RX ADMIN — TAMSULOSIN HYDROCHLORIDE 0.4 MG: 0.4 CAPSULE ORAL at 09:00

## 2017-05-18 RX ADMIN — INSULIN LISPRO 2 UNITS: 100 INJECTION, SOLUTION INTRAVENOUS; SUBCUTANEOUS at 07:16

## 2017-05-18 RX ADMIN — Medication 10 ML: at 07:17

## 2017-05-18 RX ADMIN — LEVOFLOXACIN 750 MG: 750 TABLET, FILM COATED ORAL at 12:17

## 2017-05-18 RX ADMIN — IPRATROPIUM BROMIDE AND ALBUTEROL SULFATE 3 ML: .5; 3 SOLUTION RESPIRATORY (INHALATION) at 07:15

## 2017-05-18 NOTE — PROGRESS NOTES
Pharmacy Dosing Services: Warfarin    Consult for Warfarin Dosing by Pharmacy by Dr. Yane Bolton provided for this 68 y.o.  male , for indication of Atrial Fibrillation. Dose to achieve an INR goal of 2-3    Order entered for Warfarin 3mg to be given today at 18:00. Pt from [facility]. He takes Marcumar ( Phenprocoumon ) 3 mg daily which is a derivative of Warfarin. Marcumar 3 mg is approximately equivalent to Warfarin 7 mg. Significant drug interactions: Levaquin  PT/INR Lab Results   Component Value Date/Time    INR 2.7 05/18/2017 05:47 AM      Platelets Lab Results   Component Value Date/Time    PLATELET 545 78/10/0185 05:47 AM      H/H     Lab Results   Component Value Date/Time    HGB 13.0 05/18/2017 05:47 AM        Warfarin Administrations (last 168 hours)       Date/Time Action Medication Dose      05/17/17 1736 Given    warfarin (COUMADIN) tablet 5 mg 5 mg              Pharmacy to follow daily and will provide subsequent Warfarin dosing based on clinical status.   [place] [facility], [address]

## 2017-05-18 NOTE — DIABETES MGMT
NUTRITION / GLYCEMIC CONTROL PLAN OF CARE        Diabetes Management:    -pt with known h/o of T2DM, controlled aeb A1c 6.5% oral home regimen  -steroid induced hyperglycemia, BG out of range, POCT + corrective coverage orders in place, recommend continue  - recommend: pt has transitioned to oral steroid therapy, continue corrective coverage  - goals:   *PO intake will be 75% of meals offered by 5/23   *BG will be in target range (non-ICU) <140 mg/dL by 5/23  - TDD: 10 units corrective coverage  - BG range: 139-206 mg/dL  - Hypo: no- BG in target range (non-ICU: <140; ICU<180): [] Yes  [x] No  - Steroids: Prednisone  - Intake: good   Patient Vitals for the past 100 hrs:   % Diet Eaten   05/17/17 1925 100 %   05/17/17 1730 100 %   05/17/17 1300 50 %   05/17/17 1000 75 %   05/16/17 2023 100 %   05/16/17 1925 100 %        Current Insulin regimen:   - Humalog Normal Insulin Sensitivity Corrective CoverageHome medication/insulin regimen:  Metformin 500 mg BID with mels    Recent Glucose Results: Lab Results   Component Value Date/Time     (H) 05/18/2017 05:47 AM    GLUCPOC 139 (H) 05/18/2017 11:15 AM    GLUCPOC 147 (H) 05/18/2017 09:41 AM    GLUCPOC 157 (H) 05/18/2017 06:54 AM       Adequate glycemic control PTA:  [] Yes  [x] No    HbA1c: equivalent  to ave BGlucose of 143 mg/dl for 2-3 months prior to admission    Lab Results   Component Value Date/Time    Hemoglobin A1c 6.6 05/17/2017 10:04 AM       Diet:   Active Orders   Diet    DIET CARDIAC Regular       Abihnav Wyman RN, MS  Glycemic Control Team

## 2017-05-18 NOTE — ROUTINE PROCESS
Dual AVS reviewed with Thomas Rodriguez. All medications reviewed individually with patient. Opportunities for questions and concerns provided. Patient will be discharged via (mode of transport ie. Car, ambulance or air transport) car with wife and daughter. Patient's arm band appropriately discarded.

## 2017-05-18 NOTE — PROGRESS NOTES
2030: Assumed patient care, he was alert and oriented to person, place, time and situation. Respiratory status was stable on room air. Vital signs were stable. MEWS score was a two. Patient denied any nausea vomiting dizziness or anxiety. White board was updated and explained. Bed was locked and in lowest position. Call bell, water and personal belongings were within reach. Patient had no questions, comments or concerns after bedside shift report. 0700: Patient had an uneventful shift. Respiratory status, vital signs and MEWS score remained stable. Patient was resting quietly with no signs of distress noted. Bed locked and in lowest position. Call bell water and personal belongings were within reach. Patient had no questions, comments or concerns after bedside shift report.  Bedside report given to Pascal Lesch R.N.

## 2017-05-18 NOTE — PROGRESS NOTES
conducted an initial consultation and Spiritual Assessment for Jez Levin, who is a 68 y.o.,male. Patients Primary Language is: [facility]. According to the patients EMR Spiritism Affiliation is: Other. The reason the Patient came to the hospital is:   Patient Active Problem List    Diagnosis Date Noted    Controlled type 2 diabetes mellitus without complication ([facility] 02/44/6628    Pneumonia 05/16/2017    Sepsis ([facility] 05/16/2017    Chronic obstructive pulmonary disease (HCC)     BPH (benign prostatic hyperplasia)     Atrial fibrillation ([facility]      Patient's language was limited but we got by and he appreciates what has   been done and is being done for him here. He thinks he will be able to fly back   to [facility] on Sunday. The  provided the following Interventions:  Initiated a relationship of care and support. Explored issues of inocencio, belief, spirituality and Oriental orthodox/ritual needs while hospitalized. Listened empathically. Provided chaplaincy education. Provided information about Spiritual Care Services. Offered prayer and assurance of continued prayers on patients behalf. Chart reviewed. The following outcomes were achieved:  Patient shared limited information about both their medical narrative and spiritual journey/beliefs. Patient processed feeling about current hospitalization. Patient expressed gratitude for pastoral care visit. Assessment:  Patient does not have any Oriental orthodox/cultural needs that will affect patients preferences in health care. There are no further spiritual or Oriental orthodox issues which require intervention at this time. Plan:  Chaplains will continue to follow and will provide pastoral care on an as needed/requested basis.  recommends bedside caregivers page  on duty if patient shows signs of acute spiritual or emotional distress.       Sister [place] SmithfieldRachel Garcia, [address]  [phone]

## 2017-05-18 NOTE — DISCHARGE SUMMARY
[facility]  Hospitalist Division    Discharge Summary      Patient: Rosy De MRN: [id number]  CSN: [id number]    YOB: 1940  Age: 68 y.o. Sex: male    DOA: 5/16/2017 LOS:  LOS: 2 days   Discharge Date: 05/21/17     PCP:  PROVIDER UNKNOWN    Chief Complaint:    Chief Complaint   Patient presents with    Shortness of Breath     Sepsis [facility])    Admission Diagnosis:   Hospital Problems as of 5/18/2017  Never Reviewed          Codes Class Noted - Resolved POA    Controlled type 2 diabetes mellitus without complication ([facility] [id number]: E11.9  ICD-9-CM: 250.00  5/17/2017 - Present Yes        Pneumonia ICD-10-CM: J18.9  ICD-9-CM: 861  5/16/2017 - Present Yes        Chronic obstructive pulmonary disease ([facility] ICD-10-CM: J44.9  ICD-9-CM: 945  Unknown - Present Yes        BPH (benign prostatic hyperplasia) ICD-10-CM: N40.0  ICD-9-CM: 600.00  Unknown - Present Yes        Atrial fibrillation ([facility] ICD-10-CM: I48.91  ICD-9-CM: 427.31  Unknown - Present Yes        * (Principal)Sepsis ([facility] ICD-10-CM: A41.9  ICD-9-CM: 038.9, 995.91  5/16/2017 - Present Yes              Discharge Diagnoses:    Sepsis criteria - Severe sepsis in the setting of sepsis and pneumonia with lactic acid 3.7-4.8 on admission     Pneumonia - started on levaquin     COPD - continue advair and spiriva, start on neb treatment as needed and steroids      A-fib - rate controlled. On beta blocker, need to find what blood thinner he is at home and update. Hospital Course:   68 y.o. male who has past history of A-fib, COPD, BPH, HTN presents to ER with concerns of SOB and cough. Patient is [place]ic speaking and he is visiting from [place]. He can speak little english. He reports that he has been feeling SOB with cough since last night, associated with right sided chest pain. He also had fever and chills. He denies any n/v, palpitations. He quit smoking in 2000.   In ER CXR showed RML, RLL pneumonia, wbc elevated at 23, lactate elevated at 3.7. Patient was admitted and started on levaquin as well as steroids and duonebs in addition to the patients home inhalers and coumadin for atrial fibrillation. He had steady improvement and resolution of symptoms daily. By 5/18 he was no longer SOB, nor wheezing, and was appropriate for discharge. WBC down to 17 on discharge Cr 1.2. INR 2.7. He is 98% on room air and is safe for travel back to [place] while completing antibiotic and steroid course. Follow-up CXR in 30 days recommended. Significant Diagnostic Studies:  CTA chest 5/16 no PE. RML and RLL PNA. pCXR 5/16    Consults:  Treatment Team: Consulting Provider: Pam Sánchez MD; Utilization Review: Court Lopez RN; Care Manager: Tanya Ochoa RN    Operative Procedures:  N/A    Disposition:  Home    Diet:  Cardiac    Discharge Condition:   Good    Discharge Medications:    Discharge Medication List as of 5/18/2017  1:37 PM      START taking these medications    Details   predniSONE (DELTASONE) 50 mg tablet Take 1 Tab by mouth daily (with lunch) for 5 days. , Print, Disp-5 Tab, R-0      levoFLOXacin (LEVAQUIN) 750 mg tablet Take 1 Tab by mouth every twenty-four (24) hours for 5 days. , Print, Disp-5 Tab, R-0         CONTINUE these medications which have NOT CHANGED    Details   warfarin (COUMADIN) 3 mg tablet Take 3 mg by mouth daily. , Historical Med      metFORMIN (GLUCOPHAGE) 500 mg tablet Take  by mouth two (2) times daily (with meals). , Historical Med      torsemide (DEMADEX) 5 mg tablet Take 5 mg by mouth daily. , Historical Med      spironolactone (ALDACTONE) 50 mg tablet Take 50 mg by mouth daily. , Historical Med      nebivolol (BYSTOLIC) 5 mg tablet Take 5 mg by mouth daily. , Historical Med      simvastatin (ZOCOR) 40 mg tablet Take 40 mg by mouth nightly., Historical Med      tiotropium (SPIRIVA WITH HANDIHALER) 18 mcg inhalation capsule Take  by inhalation daily. , Historical Med      tamsulosin (FLOMAX) 0.4 mg capsule Take 0.4 mg by mouth daily. , Historical Med      fluticasone-salmeterol (ADVAIR DISKUS) 100-50 mcg/dose diskus inhaler Take 1 Puff by inhalation every twelve (12) hours. , Historical Med      !! OTHER Take 3 mg by mouth every evening. Marcumar 3 mg 1 tablet every evening, Historical Med      !! OTHER Take  by mouth daily. Digimerck minor 0,07, Historical Med      roflumilast (DALIRESP) tab tablet Take 500 mcg by mouth daily. , Historical Med       !! - Potential duplicate medications found. Please discuss with provider. Follow-Up And Discharge Instructions: Follow-up Information     Follow up With Details Comments Contact Info    Provider Unknown In 1 week Schedule a appointment with primary care physician in [facility] (when you return home).  Patient not available to ask              Wound Care:   N/A      Dr Mary Jane Laureano        Time Spent:  35min    Cc: PROVIDER UNKNOWN

## 2017-05-18 NOTE — WOUND CARE
Patient seen by wound care during monthly prevalance skin assessment. Patient has danay score of 22. Skin intact.

## 2017-05-18 NOTE — PROGRESS NOTES
[address] May 18, 2017       RE: Jayda Piedra      To Whom It May Concern,    This is to certify that Jayda Piedra may return to [place] via airline as early as 5/19/2017. He has no unstable medical conditions that wound prevent transfer and poses no infectious risk to others. Please allow for transportation assistance inside the airport. Please feel free to contact my office if you have any questions or concerns. Thank you for your assistance in this matter.       Sincerely,  Ernestine Marquez, [address]

## 2017-05-18 NOTE — DISCHARGE INSTRUCTIONS
Lab Results   Component Value Date/Time    Hemoglobin A1c 6.6 05/17/2017 10:04 AM       This lab test reflects that your blood sugar has been slightly elevated over the past 3 months and should be evaluated by your primary care provider. An A1C of 5.7-6.4% meets the criteria for pre-diabetes; an A1C of 6.5% or higher meets the criteria for diabetes. This lab test reflects that your blood sugar averaged 143 mg/dL  over the past 3 months. It is important to follow up with your provider on a routine basis to continue to evaluate your blood sugar and discuss any necessary changes in treatment. DISCHARGE SUMMARY from Nurse    The following personal items are in your possession at time of discharge:    Dental Appliances: None  Visual Aid: None     Home Medications: Kept at bedside  Jewelry: Necklace, Watch, Ring  Clothing: None  Other Valuables: Eyeglasses  Personal Items Sent to Safe: none          PATIENT INSTRUCTIONS:    What to do at Home:    Recommended activity: Activity as tolerated. Please follow up with primary care physician in [facility]. *  Please give a list of your current medications to your Primary Care Provider. *  Please update this list whenever your medications are discontinued, doses are      changed, or new medications (including over-the-counter products) are added. *  Please carry medication information at all times in case of emergency situations. These are general instructions for a healthy lifestyle:    No smoking/ No tobacco products/ Avoid exposure to second hand smoke    Surgeon General's Warning:  Quitting smoking now greatly reduces serious risk to your health.     Obesity, smoking, and sedentary lifestyle greatly increases your risk for illness    A healthy diet, regular physical exercise & weight monitoring are important for maintaining a healthy lifestyle    You may be retaining fluid if you have a history of heart failure or if you experience any of the following symptoms:  Weight gain of 3 pounds or more overnight or 5 pounds in a week, increased swelling in our hands or feet or shortness of breath while lying flat in bed. Please call your doctor as soon as you notice any of these symptoms; do not wait until your next office visit. Recognize signs and symptoms of STROKE:    F-face looks uneven    A-arms unable to move or move unevenly    S-speech slurred or non-existent    T-time-call 911 as soon as signs and symptoms begin-DO NOT go       Back to bed or wait to see if you get better-TIME IS BRAIN. Warning Signs of HEART ATTACK     Call 911 if you have these symptoms:   Chest discomfort. Most heart attacks involve discomfort in the center of the chest that lasts more than a few minutes, or that goes away and comes back. It can feel like uncomfortable pressure, squeezing, fullness, or pain.  Discomfort in other areas of the upper body. Symptoms can include pain or discomfort in one or both arms, the back, neck, jaw, or stomach.  Shortness of breath with or without chest discomfort.  Other signs may include breaking out in a cold sweat, nausea, or lightheadedness. Don't wait more than five minutes to call 911 - MINUTES MATTER! Fast action can save your life. Calling 911 is almost always the fastest way to get lifesaving treatment. Emergency Medical Services staff can begin treatment when they arrive -- up to an hour sooner than if someone gets to the hospital by car. The discharge information has been reviewed with the patient, spouse and daughter. The patient, spouse and daughter verbalized understanding. Discharge medications reviewed with the patient, spouse and daughter and appropriate educational materials and side effects teaching were provided.     Patient armband removed and shredded

## 2017-05-20 LAB
BACTERIA SPEC CULT: ABNORMAL
GRAM STN SPEC: ABNORMAL
SERVICE CMNT-IMP: ABNORMAL

## 2017-05-22 LAB
BACTERIA SPEC CULT: NORMAL
SERVICE CMNT-IMP: NORMAL